# Patient Record
Sex: MALE | Race: WHITE | Employment: OTHER | URBAN - NONMETROPOLITAN AREA
[De-identification: names, ages, dates, MRNs, and addresses within clinical notes are randomized per-mention and may not be internally consistent; named-entity substitution may affect disease eponyms.]

---

## 2017-05-30 ENCOUNTER — HOSPITAL ENCOUNTER (OUTPATIENT)
Age: 62
Setting detail: SPECIMEN
Discharge: HOME | End: 2017-05-30

## 2017-05-30 PROCEDURE — 87070 CULTURE OTHR SPECIMN AEROBIC: CPT

## 2017-05-30 PROCEDURE — 87205 SMEAR GRAM STAIN: CPT

## 2017-06-01 LAB
CULTURE: NORMAL
CULTURE: NORMAL
DIRECT EXAM: NORMAL
DIRECT EXAM: NORMAL
Lab: NORMAL
SPECIMEN DESCRIPTION: NORMAL
SPECIMEN DESCRIPTION: NORMAL
STATUS: NORMAL

## 2018-05-11 ENCOUNTER — OFFICE VISIT (OUTPATIENT)
Dept: PRIMARY CARE CLINIC | Age: 63
End: 2018-05-11

## 2018-05-11 VITALS
DIASTOLIC BLOOD PRESSURE: 93 MMHG | HEART RATE: 90 BPM | SYSTOLIC BLOOD PRESSURE: 146 MMHG | WEIGHT: 218.9 LBS | TEMPERATURE: 98 F | BODY MASS INDEX: 32.42 KG/M2 | HEIGHT: 69 IN | RESPIRATION RATE: 20 BRPM | OXYGEN SATURATION: 95 %

## 2018-05-11 DIAGNOSIS — B35.4 TINEA CORPORIS: ICD-10-CM

## 2018-05-11 DIAGNOSIS — L03.115 CELLULITIS OF RIGHT THIGH: Primary | ICD-10-CM

## 2018-05-11 PROCEDURE — 99213 OFFICE O/P EST LOW 20 MIN: CPT | Performed by: NURSE PRACTITIONER

## 2018-05-11 RX ORDER — CEPHALEXIN 250 MG/1
250 CAPSULE ORAL 4 TIMES DAILY
Qty: 28 CAPSULE | Refills: 0 | Status: SHIPPED | OUTPATIENT
Start: 2018-05-11 | End: 2018-05-18

## 2018-05-11 RX ORDER — CLOTRIMAZOLE 1 %
CREAM (GRAM) TOPICAL
Qty: 30 G | Refills: 0 | Status: SHIPPED | OUTPATIENT
Start: 2018-05-11 | End: 2018-05-18

## 2018-05-11 ASSESSMENT — ENCOUNTER SYMPTOMS
SORE THROAT: 0
SHORTNESS OF BREATH: 0
DIARRHEA: 0
COUGH: 0
VOMITING: 0
WHEEZING: 0
NAUSEA: 0

## 2018-05-17 ENCOUNTER — OFFICE VISIT (OUTPATIENT)
Dept: FAMILY MEDICINE CLINIC | Age: 63
End: 2018-05-17

## 2018-05-17 VITALS
SYSTOLIC BLOOD PRESSURE: 121 MMHG | HEART RATE: 76 BPM | WEIGHT: 215 LBS | BODY MASS INDEX: 31.84 KG/M2 | HEIGHT: 69 IN | DIASTOLIC BLOOD PRESSURE: 81 MMHG

## 2018-05-17 DIAGNOSIS — L60.0 INGROWN NAIL OF GREAT TOE OF RIGHT FOOT: ICD-10-CM

## 2018-05-17 DIAGNOSIS — B35.4 TINEA CORPORIS: Primary | ICD-10-CM

## 2018-05-17 PROCEDURE — 99213 OFFICE O/P EST LOW 20 MIN: CPT | Performed by: INTERNAL MEDICINE

## 2018-05-17 RX ORDER — TERBINAFINE HYDROCHLORIDE 250 MG/1
250 TABLET ORAL DAILY
Qty: 14 TABLET | Refills: 0 | Status: SHIPPED | OUTPATIENT
Start: 2018-05-17 | End: 2018-05-31

## 2018-05-17 ASSESSMENT — ENCOUNTER SYMPTOMS
NAUSEA: 0
CONSTIPATION: 0
RESPIRATORY NEGATIVE: 1
BLOOD IN STOOL: 0
ABDOMINAL PAIN: 0
SORE THROAT: 0
DIARRHEA: 0

## 2018-05-17 ASSESSMENT — PATIENT HEALTH QUESTIONNAIRE - PHQ9
SUM OF ALL RESPONSES TO PHQ QUESTIONS 1-9: 0
SUM OF ALL RESPONSES TO PHQ9 QUESTIONS 1 & 2: 0
2. FEELING DOWN, DEPRESSED OR HOPELESS: 0
1. LITTLE INTEREST OR PLEASURE IN DOING THINGS: 0

## 2018-06-01 ENCOUNTER — OFFICE VISIT (OUTPATIENT)
Dept: FAMILY MEDICINE CLINIC | Age: 63
End: 2018-06-01

## 2018-06-01 VITALS
HEART RATE: 78 BPM | SYSTOLIC BLOOD PRESSURE: 138 MMHG | HEIGHT: 69 IN | BODY MASS INDEX: 31.99 KG/M2 | WEIGHT: 216 LBS | DIASTOLIC BLOOD PRESSURE: 78 MMHG

## 2018-06-01 DIAGNOSIS — L03.314 CELLULITIS OF RIGHT GROIN: ICD-10-CM

## 2018-06-01 DIAGNOSIS — B35.4 TINEA CORPORIS: Primary | ICD-10-CM

## 2018-06-01 PROCEDURE — 99213 OFFICE O/P EST LOW 20 MIN: CPT | Performed by: INTERNAL MEDICINE

## 2018-06-01 RX ORDER — SULFAMETHOXAZOLE AND TRIMETHOPRIM 800; 160 MG/1; MG/1
1 TABLET ORAL 2 TIMES DAILY
Qty: 20 TABLET | Refills: 0 | Status: SHIPPED | OUTPATIENT
Start: 2018-06-01 | End: 2018-06-11

## 2018-06-01 ASSESSMENT — ENCOUNTER SYMPTOMS
ABDOMINAL PAIN: 0
DIARRHEA: 0
NAUSEA: 0
CONSTIPATION: 0
BLOOD IN STOOL: 0
SORE THROAT: 0
RESPIRATORY NEGATIVE: 1

## 2018-06-18 ENCOUNTER — TELEPHONE (OUTPATIENT)
Dept: FAMILY MEDICINE CLINIC | Age: 63
End: 2018-06-18

## 2018-06-18 DIAGNOSIS — R21 RASH OF BODY: Primary | ICD-10-CM

## 2019-10-24 LAB
ALBUMIN SERPL-MCNC: NORMAL G/DL
ALP BLD-CCNC: NORMAL U/L
ALT SERPL-CCNC: NORMAL U/L
ANION GAP SERPL CALCULATED.3IONS-SCNC: NORMAL MMOL/L
AST SERPL-CCNC: NORMAL U/L
BILIRUB SERPL-MCNC: NORMAL MG/DL
BUN BLDV-MCNC: NORMAL MG/DL
CALCIUM SERPL-MCNC: NORMAL MG/DL
CHLORIDE BLD-SCNC: NORMAL MMOL/L
CHOLESTEROL, TOTAL: 184 MG/DL
CHOLESTEROL/HDL RATIO: NORMAL
CO2: NORMAL
CREAT SERPL-MCNC: 1.2 MG/DL
GFR CALCULATED: NORMAL
GLUCOSE BLD-MCNC: NORMAL MG/DL
HDLC SERPL-MCNC: 45 MG/DL (ref 35–70)
LDL CHOLESTEROL CALCULATED: 116 MG/DL (ref 0–160)
POTASSIUM SERPL-SCNC: 4.7 MMOL/L
PROSTATE SPECIFIC ANTIGEN: 1.6 NG/ML
SODIUM BLD-SCNC: NORMAL MMOL/L
TOTAL PROTEIN: NORMAL
TRIGL SERPL-MCNC: 87 MG/DL
TSH SERPL DL<=0.05 MIU/L-ACNC: 2.56 UIU/ML
VLDLC SERPL CALC-MCNC: NORMAL MG/DL

## 2022-04-13 LAB
AVERAGE GLUCOSE: NORMAL
CHOLESTEROL, TOTAL: 167 MG/DL
CHOLESTEROL/HDL RATIO: ABNORMAL
HBA1C MFR BLD: 6.7 %
HDLC SERPL-MCNC: 33 MG/DL (ref 35–70)
LDL CHOLESTEROL CALCULATED: 132 MG/DL (ref 0–160)
NONHDLC SERPL-MCNC: ABNORMAL MG/DL
PROSTATE SPECIFIC ANTIGEN: 1.14 NG/ML
TRIGL SERPL-MCNC: 145 MG/DL
TSH SERPL DL<=0.05 MIU/L-ACNC: 2.22 UIU/ML
VITAMIN D 25-HYDROXY: 77
VITAMIN D2, 25 HYDROXY: NORMAL
VITAMIN D3,25 HYDROXY: NORMAL
VLDLC SERPL CALC-MCNC: ABNORMAL MG/DL

## 2022-04-25 DIAGNOSIS — R53.83 FATIGUE, UNSPECIFIED TYPE: ICD-10-CM

## 2022-04-25 DIAGNOSIS — E78.5 HYPERLIPIDEMIA, UNSPECIFIED HYPERLIPIDEMIA TYPE: ICD-10-CM

## 2022-04-25 DIAGNOSIS — E55.9 VITAMIN D DEFICIENCY, UNSPECIFIED: ICD-10-CM

## 2022-04-25 DIAGNOSIS — R06.00 DYSPNEA, UNSPECIFIED TYPE: Primary | ICD-10-CM

## 2022-04-25 DIAGNOSIS — R60.1 GENERALIZED EDEMA: ICD-10-CM

## 2022-05-04 ENCOUNTER — OFFICE VISIT (OUTPATIENT)
Dept: CARDIOLOGY CLINIC | Age: 67
End: 2022-05-04
Payer: OTHER GOVERNMENT

## 2022-05-04 ENCOUNTER — HOSPITAL ENCOUNTER (OUTPATIENT)
Age: 67
Discharge: HOME | End: 2022-05-04
Payer: OTHER GOVERNMENT

## 2022-05-04 VITALS
OXYGEN SATURATION: 95 % | SYSTOLIC BLOOD PRESSURE: 120 MMHG | BODY MASS INDEX: 35 KG/M2 | HEART RATE: 88 BPM | WEIGHT: 237 LBS | DIASTOLIC BLOOD PRESSURE: 70 MMHG

## 2022-05-04 DIAGNOSIS — I48.92 ATRIAL FLUTTER, UNSPECIFIED TYPE (HCC): Primary | ICD-10-CM

## 2022-05-04 DIAGNOSIS — R94.31 ABNORMAL EKG: ICD-10-CM

## 2022-05-04 DIAGNOSIS — R53.83 FATIGUE, UNSPECIFIED TYPE: ICD-10-CM

## 2022-05-04 DIAGNOSIS — R06.00 DYSPNEA, UNSPECIFIED TYPE: ICD-10-CM

## 2022-05-04 DIAGNOSIS — R06.02 SOB (SHORTNESS OF BREATH): ICD-10-CM

## 2022-05-04 DIAGNOSIS — E55.9 VITAMIN D DEFICIENCY, UNSPECIFIED: ICD-10-CM

## 2022-05-04 DIAGNOSIS — E78.5 HYPERLIPIDEMIA, UNSPECIFIED HYPERLIPIDEMIA TYPE: ICD-10-CM

## 2022-05-04 DIAGNOSIS — R60.1 GENERALIZED EDEMA: ICD-10-CM

## 2022-05-04 LAB
EKG ATRIAL RATE: 87 BPM
EKG P-R INTERVAL: 196 MS
EKG Q-T INTERVAL: 370 MS
EKG QRS DURATION: 100 MS
EKG QTC CALCULATION (BAZETT): 445 MS
EKG R AXIS: 102 DEGREES
EKG T AXIS: 24 DEGREES
EKG VENTRICULAR RATE: 87 BPM

## 2022-05-04 PROCEDURE — 99204 OFFICE O/P NEW MOD 45 MIN: CPT | Performed by: INTERNAL MEDICINE

## 2022-05-04 PROCEDURE — 93010 ELECTROCARDIOGRAM REPORT: CPT | Performed by: INTERNAL MEDICINE

## 2022-05-04 PROCEDURE — 93005 ELECTROCARDIOGRAM TRACING: CPT

## 2022-05-04 RX ORDER — CHLORAL HYDRATE 500 MG
CAPSULE ORAL
COMMUNITY
End: 2022-08-18

## 2022-05-04 RX ORDER — SPIRONOLACTONE 25 MG/1
25 TABLET ORAL DAILY
Qty: 30 TABLET | Refills: 11 | Status: SHIPPED | OUTPATIENT
Start: 2022-05-04

## 2022-05-04 RX ORDER — AMLODIPINE BESYLATE 10 MG/1
10 TABLET ORAL DAILY
COMMUNITY
End: 2022-05-04 | Stop reason: ALTCHOICE

## 2022-05-04 RX ORDER — FUROSEMIDE 20 MG/1
20 TABLET ORAL DAILY
COMMUNITY

## 2022-05-04 RX ORDER — LISINOPRIL 5 MG/1
5 TABLET ORAL DAILY
Qty: 30 TABLET | Refills: 11 | Status: SHIPPED | OUTPATIENT
Start: 2022-05-04

## 2022-05-04 NOTE — PROGRESS NOTES
Ov DR Shamir Rowland consult from [facility]. since December   Gradually gotten worse. First noticed when hunting. Started walking in March   Started eating better. No change. No chest pain   richar ankle edema   Started lasix per [address] 4/6  Been helping. No change in sob.  htn on Norvasc for 6 mths. Uses cpap. Smoker trying to cut back. Drinks 18 beers weekly. Retired  Dry wall contractor. Self employed. History of A Flutter on eliquis  For last couple days. Family history: mother pacemaker  Has one sister no heart disease.  with one child   Daughter Jerry Sousa age 45 living with pt   Since October. Who is taking drugs. Will be seeing DR Back in June  For colonoscopy. Bedside echo done. Will do cardioversion in 3 weeks. Will do lexiscan. Will start aldactone 25 mg daily  Start lisinopril 5 mg daily  Start metoprolol 25 mg 1/2 tab daily   See 1 mth after cardioversion.

## 2022-05-08 NOTE — PROGRESS NOTES
Yuriy Cheng MD  [facility]  [address] [place] [address]  [phone]    May 4, 2022      Chanel Costello NP  [facility] [place]      RE:   Caity Lockett  :  1955    Dear Gypsy Gregory:    CHIEF COMPLAINT:  1. Shortness of breath. 2.  Atrial flutter. 3.  Pedal edema. HISTORY OF PRESENT ILLNESS:  I had the pleasure of seeing Dylan Pinto in our office on 2022. He is a very pleasant 80-year-old gentleman who has never had a cardiac issue in the past.  He has never had a stress test or echocardiogram in the past in his history. In December, he noticed that he was having dyspnea with exertion. For instance, he went hunting and when he was walking, he had to stop because of shortness of breath. This gradually worsened ever since December. He then changed his lifestyle and started walking in March. He also started to eat better; however, he continued to be short of breath with exertion. He had some chest discomfort that had both typical and atypical characteristics. He then developed bilateral ankle edema, which gradually worsened. He was started on Lasix at the [facility] on . It has been helping his edema, but there has been no change in his shortness of breath. When he walked into our office he had to stop on the way because of shortness of breath. He has a history of multiple small left lower lung nodules, which have not changed on CT scans. Because of his shortness of breath, he had a chest x-ray at the [place] that showed increased bronchovascular markings consistent with either inflammation or mild pulmonary vascular congestion. Multiple labs done at the [place] were fairly unremarkable. He had an echocardiogram at the [facility] in [place]. His echo showed an EF of 45% to 50% with mild global hypokinesis. It was noted that he was in atrial flutter. He was started on Eliquis 5 mg b.i.d. two days ago.   He was also placed on Lasix 20 mg daily. His edema is better after being placed on Lasix. He still has PND and orthopnea and feels like he is \"smothering\" when he lies flat. He denies any syncope or near syncope, lightheadedness or dizziness. He cannot tell that he is in atrial flutter. Again, he has no history of any arrhythmias or heart issues prior to his shortness of breath that began in 12/2021. Of note, he had an echocardiogram in 2015 that showed normal LV function with an EF of 60% to 65%. His shortness of breath changed and it became worse approximately three to four weeks ago. His energy level also became very low. It has gradually decreased ever since December but took a turn for the worse three to four weeks ago. CARDIAC RISK FACTORS:  Smoking:  Positive. Hypertension:  Positive. Hyperlipidemia:  Positive. Peripheral Vascular Disease:  Negative. Other Family Members:  Positive. Diabetes:  Positive, with a hemoglobin A1c of 6.7. MEDICATIONS AT THIS TIME:  He is on Eliquis 5 mg b.i.d., Lasix 20 mg daily, vitamin D 2000 units daily, he was on amlodipine 5 mg daily. PAST MEDICAL AND SURGICAL HISTORY:  1. He has a history of left Achilles tendon tear on 02/08/2018. 2.  Adenomatous polyp of the colon on 08/03/2016.  3.  Alcohol abuse on 02/03/2016.  4.  Shoulder pain on 02/19/2014.  5.  Sleep apnea, on a CPAP mask, which he has been on for several years. 6.  Small nodules in the lung, which appear to be benign. 7.  History of a possible mass in the left lobe of the liver by CT scan but was negative by MRI and most consistent with a cyst.    FAMILY HISTORY:  Mother had a myocardial infarction and a pacemaker. He has one sister with no heart disease. SOCIAL HISTORY:  He is 79years old. He is , with one child whose name is Esetfani Lucero, age 45, who was a drug abuser and living with him since October, which causes a fair amount of stress. He is retired.   Smokes approximately a pack of cigarettes per day. He drinks 18 beers weekly. He is a retired  and was self-employed. Does not exercise. Enjoys hunting. REVIEW OF SYSTEMS:  Cardiac as above. Other systems reviewed including constitutional, eyes, ears, nose and throat, cardiovascular, respiratory, GI, , musculoskeletal, integumentary, neurologic, endocrine, hematologic and allergic/immunologic are negative except for what is described above. No weight loss or weight gain. No change in bowel habits. No blood in stool. No fevers, sweats or chills. PHYSICAL EXAMINATION:  VITAL SIGNS:  His blood pressure was 120/70 with a heart rate of 88 with slight irregularity. His O2 sat was 95%. Weight 237 pounds. GENERAL:  He is a pleasant 49-year-old gentleman. Denied pain. He was oriented to person, place and time. Answered questions appropriately. SKIN:  No unusual skin changes. HEENT:  The pupils are equally round and intact. Mucous membranes were dry. NECK:  No JVD. Good carotid pulses. No carotid bruits. No lymphadenopathy or thyromegaly. CARDIOVASCULAR EXAM:  S1 and S2 were normal.  No S3 or S4. Soft systolic blowing type murmur. No diastolic murmur. PMI was normal.  No lift, thrust, or pericardial friction rub. LUNGS:  Few coarse crackles bilaterally with wheezes. ABDOMEN:  Soft and nontender. Good bowel sounds. I could not feel liver, spleen or aorta. EXTREMITIES:  He had 2 to 3+ edema. Skin was warm and dry. No calf tenderness. Nail beds pink. Good cap refill. PULSES:  Bilateral symmetrical radial, brachial and carotid pulses. No carotid bruits. Good femoral and pedal pulses. NEUROLOGIC EXAM:  Within normal limits. PSYCHIATRIC EXAM:  Within normal limits. LABORATORY DATA:  From 04/29/2022, his white count was 6.9, hemoglobin 19.2, platelet count of 168,961. Glucose 127, sodium 140, potassium 4.6, BUN 12, creatinine 1.2, calcium 9.3. AST was 24, ALT was 21.   His cholesterol was 167, , HDL 33, triglycerides 145. TSH was 2.22. PSA 1.14. Urinalysis was unremarkable. Hemoglobin A1c was elevated at 6.7. EKG showed atrial flutter with T-wave abnormalities consistent with inferior ischemia and nonspecific ST changes laterally. Echocardiogram at the [facility] showed an EF of 45% to 50% with mild global hypokinesis of the left ventricle, with an echocardiogram in 2018 showing normal LV function, EF of 60% to 65%. IMPRESSION:  1.  Marked shortness of breath with exertion starting in 12/2021, which continues to worsen. 2.  Chest pain, although it is somewhat atypical.  3.  Mild LV dysfunction, EF of 45% to 50% with global hypokinesis, with an echocardiogram in 2018 showing normal LV size and function, EF of 60% to 65%. 4.  Atrial flutter with a controlled ventricular response, possibly starting three to four weeks ago. 5.  Anticoagulated at this point with Eliquis, which was started approximately two days ago. 6.  Smoking abuse. 7.  History of alcohol abuse, although he states he drinks 18 beers weekly now. 8.  Diabetes with a hemoglobin A1c of 6.7.  9. History of hypertension. 10.  Hyperlipidemia. PLAN:  1. We will do a cardioversion in three weeks to try to convert to sinus rhythm. 2.  We will do a Lexiscan Cardiolite stress test in view of his EKG changes and LV dysfunction. 3.  Start Aldactone 25 mg daily for his pedal edema. 4.  Stop Norvasc. 5.  Start lisinopril 5 mg daily because of his LV dysfunction. 6.  Start metoprolol 25 mg a half a tablet daily because of his LV dysfunction and suspicion of coronary artery disease. 7.  We will call with the results of the stress test.  8.  We will most likely proceed on with a cardiac catheterization. DISCUSSION:  Mr. Ashley Moe has had a marked change in his activity level, starting in 12/2021 when he began developing increasing shortness of breath.   This has worsened ever since, but took a turn approximately three to four weeks ago where it worsened even more. It is possible that he developed atrial flutter around that time. I doubt, however, that atrial flutter has caused his decline in activity since December. We think the flutter would be a more recent change, possibly three to four weeks ago when his activity level changed again and became worse. I highly suspect coronary artery disease with shortness of breath as an anginal equivalent along with his atypical chest pain. He does have global hypokinesis with an EF in the 45% to 50% range. His EF was normal in 2018. We will treat him both for coronary artery disease and for LV dysfunction. We will start metoprolol 25 mg a half a tablet daily and lisinopril 5 mg daily as well as start Aldactone 25 mg daily. I will stop Norvasc because of its side effect of causing pedal edema, which we are trying to improve with Aldactone. I do suspect there is a pulmonary contribution to his shortness of breath with his long-term smoking history. At some point, he will need a pulmonary function test.    After he has been anticoagulated for three weeks, we will do a cardioversion. In the meantime, we will do a Lexiscan stress test to see if he has significant ischemia present. We will almost certainly at some point do a cardiac catheterization to define his anatomy in view of his risk factors. We will be in touch with him after his stress test, and if it is abnormal we will bring him up for a discussion. We will plan on a cardioversion in three weeks ago and I will follow up after the cardioversion to review his symptoms. Thank you very much, Holley Spence, for allowing me the privilege of seeing Mr. Shahbaz Santiago. I will keep you informed as to his cardiac issues.     Sincerely,      Ferny Maldonado    D: 05/05/2022 4:39:43     T: 05/05/2022 4:47:43     [email or web address]  Job#: [id number]   Doc#: [id number]    CC:  Truman Fox

## 2022-05-09 ENCOUNTER — HOSPITAL ENCOUNTER (OUTPATIENT)
Dept: NON INVASIVE DIAGNOSTICS | Age: 67
Discharge: HOME | End: 2022-05-09
Payer: OTHER GOVERNMENT

## 2022-05-09 ENCOUNTER — HOSPITAL ENCOUNTER (OUTPATIENT)
Dept: NUCLEAR MEDICINE | Age: 67
Discharge: HOME | End: 2022-05-11
Payer: OTHER GOVERNMENT

## 2022-05-09 VITALS — SYSTOLIC BLOOD PRESSURE: 118 MMHG | HEART RATE: 73 BPM | DIASTOLIC BLOOD PRESSURE: 79 MMHG

## 2022-05-09 DIAGNOSIS — R94.31 ABNORMAL EKG: ICD-10-CM

## 2022-05-09 DIAGNOSIS — R06.02 SOB (SHORTNESS OF BREATH): ICD-10-CM

## 2022-05-09 PROCEDURE — A9500 TC99M SESTAMIBI: HCPCS | Performed by: INTERNAL MEDICINE

## 2022-05-09 PROCEDURE — 93017 CV STRESS TEST TRACING ONLY: CPT

## 2022-05-09 PROCEDURE — 2580000003 HC RX 258: Performed by: INTERNAL MEDICINE

## 2022-05-09 PROCEDURE — 78452 HT MUSCLE IMAGE SPECT MULT: CPT

## 2022-05-09 PROCEDURE — 3430000000 HC RX DIAGNOSTIC RADIOPHARMACEUTICAL: Performed by: INTERNAL MEDICINE

## 2022-05-09 PROCEDURE — 6360000002 HC RX W HCPCS: Performed by: INTERNAL MEDICINE

## 2022-05-09 RX ORDER — AMINOPHYLLINE DIHYDRATE 25 MG/ML
50 INJECTION, SOLUTION INTRAVENOUS PRN
Status: DISPENSED | OUTPATIENT
Start: 2022-05-09 | End: 2022-05-09

## 2022-05-09 RX ORDER — SODIUM CHLORIDE 0.9 % (FLUSH) 0.9 %
5-40 SYRINGE (ML) INJECTION PRN
Status: ACTIVE | OUTPATIENT
Start: 2022-05-09 | End: 2022-05-09

## 2022-05-09 RX ADMIN — TETRAKIS(2-METHOXYISOBUTYLISOCYANIDE)COPPER(I) TETRAFLUOROBORATE 10 MILLICURIE: 1 INJECTION, POWDER, LYOPHILIZED, FOR SOLUTION INTRAVENOUS at 07:55

## 2022-05-09 RX ADMIN — TETRAKIS(2-METHOXYISOBUTYLISOCYANIDE)COPPER(I) TETRAFLUOROBORATE 30 MILLICURIE: 1 INJECTION, POWDER, LYOPHILIZED, FOR SOLUTION INTRAVENOUS at 09:15

## 2022-05-09 RX ADMIN — Medication 10 ML: at 09:13

## 2022-05-09 RX ADMIN — REGADENOSON 0.4 MG: 0.08 INJECTION, SOLUTION INTRAVENOUS at 09:13

## 2022-05-09 NOTE — PROGRESS NOTES
Testing complete - patient tolerated well. Patient states his shortness of breath is back to baseline - denies any pain/discomofort. Drink and snack offered.

## 2022-05-09 NOTE — PROCEDURES
[place] [address]                              CARDIAC STRESS TEST    PATIENT NAME: Niyah Suggs                    :        1955  MED REC NO:   [id number]                              ROOM:  ACCOUNT NO:   [de-identified]                           ADMIT DATE: 2022  PROVIDER:     Pablito Tyson    DATE OF STUDY:  2022    Cardiovascular Diagnostics Department    Ordering Provider:  Tricia Adames MD    Primary Care Provider:  Germán Aguilar MD    Interpreting Physician:  Pablito Tyson MD    MYOCARDIAL PERFUSION STRESS IMAGING    The stress ECG results are reported separately. NUCLEAR IMAGING RESULTS:  The overall quality of the study is good. Mild/moderate attenuation artifact was seen. There is no evidence of  abnormal lung uptake. Additionally, the right ventricle appears normal.  The left ventricular cavity is noted to be normal in size on stress  images. There is evidence of transient ischemic dilatation (TID) of the  left ventricle. Gated SPECT imaging reveals normal myocardial thickening and wall motion  with a calculated left ventricular ejection fraction (EF) of 56%. The rest images demonstrated a small perfusion abnormality of moderate  intensity in the inferior and inferoseptal region(s), which is most  likely due to artifact. On stress imaging, a small/moderate perfusion abnormality of mild  intensity was noted in the inferolateral and inferior region(s), which  is most likely due to artifact. IMPRESSION:  1. Equivocal myocardial perfusion study. There is a small/moderate  perfusion defect of mild intensity in the inferolateral and inferior  region(s) during stress imaging, which is most consistent with artifact,  but may be due to a small degree of coronary ischemia.     In addition, transient ischemic dilatation (TID) of the left ventricle  is seen, which can be seen with uncontrolled hypertension, severe left  main coronary artery disease or severe three-vessel coronary artery  disease (TID 1.35). 2.  Global left ventricular systolic function was normal without  regional wall motion abnormalities. Overall these results are most consistent with an intermediate/high risk  for significant coronary artery disease. Additional testing including cardiac catheterization may be indicated. The results of this test were discussed with Dr. Gomez Yadav on 05/09/2022.         Leora Rayo    D: 05/09/2022 15:25:39       T: 05/09/2022 15:27:10     JESSICA/[email or web address]_EDIT  Job#: [id number]     Doc#: Unknown    CC:  Truman Reyes

## 2022-05-10 NOTE — PROCEDURES
[place] [address]                              CARDIAC STRESS TEST    PATIENT NAME: Prem ARNOLD                    :        1955  MED REC NO:   [id number]                              ROOM:  ACCOUNT NO:   [de-identified]                           ADMIT DATE: 2022  PROVIDER:     Lesa Hargrove      DATE OF STUDY:  2022    LEXISCAN CARDIOLITE STRESS TEST:    INDICATION:  Chest pain. IMPRESSION:  1. We gave 0.4 mg of Lexiscan intravenously. 2.  This was followed in 20 seconds by Cardiolite infusion. 3.  There was no chest pain. 4.  There was no ST depression. 5.  It was an overall negative Lexiscan stress test.  6.  Cardiolite to follow.         Hyla Frankel    D: 05/10/2022 7:22:31       T: 05/10/2022 7:55:44     [email or web address]  Job#: [id number]     Doc#: [id number]    CC:  Truman Fox

## 2022-05-12 ENCOUNTER — TELEPHONE (OUTPATIENT)
Dept: CARDIOLOGY CLINIC | Age: 67
End: 2022-05-12

## 2022-05-25 ENCOUNTER — HOSPITAL ENCOUNTER (OUTPATIENT)
Dept: NON INVASIVE DIAGNOSTICS | Age: 67
Discharge: HOME | End: 2022-05-25
Payer: OTHER GOVERNMENT

## 2022-05-25 VITALS
OXYGEN SATURATION: 96 % | DIASTOLIC BLOOD PRESSURE: 84 MMHG | RESPIRATION RATE: 12 BRPM | HEART RATE: 83 BPM | SYSTOLIC BLOOD PRESSURE: 128 MMHG

## 2022-05-25 DIAGNOSIS — I48.92 ATRIAL FLUTTER, UNSPECIFIED TYPE (HCC): ICD-10-CM

## 2022-05-25 PROCEDURE — 93005 ELECTROCARDIOGRAM TRACING: CPT | Performed by: INTERNAL MEDICINE

## 2022-05-25 PROCEDURE — 6360000002 HC RX W HCPCS: Performed by: INTERNAL MEDICINE

## 2022-05-25 PROCEDURE — 2580000003 HC RX 258: Performed by: INTERNAL MEDICINE

## 2022-05-25 PROCEDURE — 92960 CARDIOVERSION ELECTRIC EXT: CPT

## 2022-05-25 RX ORDER — SODIUM CHLORIDE 450 MG/100ML
INJECTION, SOLUTION INTRAVENOUS CONTINUOUS PRN
Status: COMPLETED | OUTPATIENT
Start: 2022-05-25 | End: 2022-05-25

## 2022-05-25 RX ORDER — PROPOFOL 10 MG/ML
INJECTION, EMULSION INTRAVENOUS CONTINUOUS PRN
Status: COMPLETED | OUTPATIENT
Start: 2022-05-25 | End: 2022-05-25

## 2022-05-25 RX ADMIN — PROPOFOL 100 MG: 10 INJECTION INTRAVENOUS at 12:12

## 2022-05-25 RX ADMIN — SODIUM CHLORIDE 100 ML/HR: 4.5 INJECTION, SOLUTION INTRAVENOUS at 11:56

## 2022-05-25 NOTE — PRE SEDATION
Sedation Pre-Procedure Note    Patient Name: Martha Allred   YOB: 1955  Room/Bed: Room/bed info not found  Medical Record Number: [id number]  Date: 5/25/2022   Time: 11:20 AM       Indication: atrial fibrillation    Consent: I have discussed with the patient and/or the patient representative the indication, alternatives, and the possible risks and/or complications of the planned procedure and the anesthesia methods. The patient and/or patient representative appear to understand and agree to proceed. Vital Signs: There were no vitals filed for this visit. Past Medical History:   has a past medical history of Hyperlipidemia and Primary hypertension. Past Surgical History:   has a past surgical history that includes Colonoscopy (09/13/2012); Appendectomy; Tonsillectomy; vascular surgery; hernia repair; Colonoscopy (07/19/2016); and Achilles tendon surgery. Medications:   Scheduled Meds:   Continuous Infusions:   PRN Meds:   Home Meds:   Prior to Admission medications    Medication Sig Start Date End Date Taking?  Authorizing Provider   Omega-3 1000 MG CAPS Take by mouth 4 tabs bid    Historical Provider, MD   apixaban (ELIQUIS) 5 MG TABS tablet Take by mouth 2 times daily    Historical Provider, MD   furosemide (LASIX) 20 MG tablet Take 20 mg by mouth daily    Historical Provider, MD   metoprolol tartrate (LOPRESSOR) 25 MG tablet Take 0.5 tablets by mouth daily 5/4/22   Roxane Harden MD   lisinopril (PRINIVIL;ZESTRIL) 5 MG tablet Take 1 tablet by mouth daily 5/4/22   Roxane Harden MD   spironolactone (ALDACTONE) 25 MG tablet Take 1 tablet by mouth daily 5/4/22   Roxane Harden MD   Cholecalciferol (VITAMIN D PO) Take 2,000 Units by mouth     Historical Provider, MD     Coumadin Use Last 7 Days:  no  Antiplatelet drug therapy use last 7 days: no  Other anticoagulant use last 7 days: yes - eliquis  Additional Medication Information:          Pre-Sedation Documentation and Exam:   I have personally completed a history, physical exam & review of systems for this patient (see notes).     Mallampati Airway Assessment:  normal    Prior History of Anesthesia Complications:   none    ASA Classification:  Class 2 - A normal healthy patient with mild systemic disease    Sedation/ Anesthesia Plan:   intravenous sedation    Medications Planned:   propofol intravenously    Patient is an appropriate candidate for plan of sedation: yes    Electronically signed by Kitty Wagner MD on 5/25/2022 at 11:20 AM

## 2022-05-25 NOTE — H&P
Sowmya Schultz MD  [facility]  [facility]  [address] Khush[address] Hebert [address]  [phone]     May 4, 2022        Dexter Sharma NP  [facility] [place]        RE:   Kaylan Barriga  :  1955     Dear [employer]:     CHIEF COMPLAINT:  1. Shortness of breath. 2.  Atrial flutter. 3.  Pedal edema.     HISTORY OF PRESENT ILLNESS:  I had the pleasure of seeing Indio Ac in our office on 2022. He is a very pleasant 79-year-old gentleman who has never had a cardiac issue in the past.  He has never had a stress test or echocardiogram in the past in his history.     In December, he noticed that he was having dyspnea with exertion. For instance, he went hunting and when he was walking, he had to stop because of shortness of breath. This gradually worsened ever since December. He then changed his lifestyle and started walking in March. He also started to eat better; however, he continued to be short of breath with exertion. He had some chest discomfort that had both typical and atypical characteristics.     He then developed bilateral ankle edema, which gradually worsened.     He was started on Lasix at the [facility] on . It has been helping his edema, but there has been no change in his shortness of breath. When he walked into our office he had to stop on the way because of shortness of breath.     He has a history of multiple small left lower lung nodules, which have not changed on CT scans.     Because of his shortness of breath, he had a chest x-ray at the [place] that showed increased bronchovascular markings consistent with either inflammation or mild pulmonary vascular congestion. Multiple labs done at the [place] were fairly unremarkable.     He had an echocardiogram at the [facility] in [place]. His echo showed an EF of 45% to 50% with mild global hypokinesis. It was noted that he was in atrial flutter. He was started on Eliquis 5 mg b.i.d. two days ago. He was also placed on Lasix 20 mg daily.     His edema is better after being placed on Lasix. He still has PND and orthopnea and feels like he is \"smothering\" when he lies flat.     He denies any syncope or near syncope, lightheadedness or dizziness. He cannot tell that he is in atrial flutter. Again, he has no history of any arrhythmias or heart issues prior to his shortness of breath that began in 12/2021.     Of note, he had an echocardiogram in 2015 that showed normal LV function with an EF of 60% to 65%.     His shortness of breath changed and it became worse approximately three to four weeks ago. His energy level also became very low. It has gradually decreased ever since December but took a turn for the worse three to four weeks ago.     CARDIAC RISK FACTORS:  Smoking:  Positive. Hypertension:  Positive. Hyperlipidemia:  Positive. Peripheral Vascular Disease:  Negative. Other Family Members:  Positive. Diabetes:  Positive, with a hemoglobin A1c of 6.7.     MEDICATIONS AT THIS TIME:  He is on Eliquis 5 mg b.i.d., Lasix 20 mg daily, vitamin D 2000 units daily, he was on amlodipine 5 mg daily.     PAST MEDICAL AND SURGICAL HISTORY:  1. He has a history of left Achilles tendon tear on 02/08/2018. 2.  Adenomatous polyp of the colon on 08/03/2016.  3.  Alcohol abuse on 02/03/2016.  4.  Shoulder pain on 02/19/2014.  5.  Sleep apnea, on a CPAP mask, which he has been on for several years. 6.  Small nodules in the lung, which appear to be benign. 7.  History of a possible mass in the left lobe of the liver by CT scan but was negative by MRI and most consistent with a cyst.     FAMILY HISTORY:  Mother had a myocardial infarction and a pacemaker. He has one sister with no heart disease.     SOCIAL HISTORY:  He is 79years old. He is , with one child whose name is Simona Deutsch, age 45, who was a drug abuser and living with him since October, which causes a fair amount of stress. He is retired.   Smokes approximately a pack of cigarettes per day. He drinks 18 beers weekly. He is a retired  and was self-employed. Does not exercise. Enjoys hunting.     REVIEW OF SYSTEMS:  Cardiac as above. Other systems reviewed including constitutional, eyes, ears, nose and throat, cardiovascular, respiratory, GI, , musculoskeletal, integumentary, neurologic, endocrine, hematologic and allergic/immunologic are negative except for what is described above. No weight loss or weight gain. No change in bowel habits. No blood in stool. No fevers, sweats or chills.     PHYSICAL EXAMINATION:  VITAL SIGNS:  His blood pressure was 120/70 with a heart rate of 88 with slight irregularity. His O2 sat was 95%. Weight 237 pounds. GENERAL:  He is a pleasant 72-year-old gentleman. Denied pain. He was oriented to person, place and time. Answered questions appropriately. SKIN:  No unusual skin changes. HEENT:  The pupils are equally round and intact. Mucous membranes were dry. NECK:  No JVD. Good carotid pulses. No carotid bruits. No lymphadenopathy or thyromegaly. CARDIOVASCULAR EXAM:  S1 and S2 were normal.  No S3 or S4. Soft systolic blowing type murmur. No diastolic murmur. PMI was normal.  No lift, thrust, or pericardial friction rub. LUNGS:  Few coarse crackles bilaterally with wheezes. ABDOMEN:  Soft and nontender. Good bowel sounds. I could not feel liver, spleen or aorta. EXTREMITIES:  He had 2 to 3+ edema. Skin was warm and dry. No calf tenderness. Nail beds pink. Good cap refill. PULSES:  Bilateral symmetrical radial, brachial and carotid pulses. No carotid bruits. Good femoral and pedal pulses. NEUROLOGIC EXAM:  Within normal limits. PSYCHIATRIC EXAM:  Within normal limits.     LABORATORY DATA:  From 04/29/2022, his white count was 6.9, hemoglobin 19.2, platelet count of 776,246. Glucose 127, sodium 140, potassium 4.6, BUN 12, creatinine 1.2, calcium 9.3. AST was 24, ALT was 21. His cholesterol was 167, , HDL 33, triglycerides 145. TSH was 2.22. PSA 1.14. Urinalysis was unremarkable. Hemoglobin A1c was elevated at 6.7.     EKG showed atrial flutter with T-wave abnormalities consistent with inferior ischemia and nonspecific ST changes laterally.     Echocardiogram at the [facility] showed an EF of 45% to 50% with mild global hypokinesis of the left ventricle, with an echocardiogram in 2018 showing normal LV function, EF of 60% to 65%.     IMPRESSION:  1.  Marked shortness of breath with exertion starting in 12/2021, which continues to worsen. 2.  Chest pain, although it is somewhat atypical.  3.  Mild LV dysfunction, EF of 45% to 50% with global hypokinesis, with an echocardiogram in 2018 showing normal LV size and function, EF of 60% to 65%. 4.  Atrial flutter with a controlled ventricular response, possibly starting three to four weeks ago. 5.  Anticoagulated at this point with Eliquis, which was started approximately two days ago. 6.  Smoking abuse. 7.  History of alcohol abuse, although he states he drinks 18 beers weekly now. 8.  Diabetes with a hemoglobin A1c of 6.7.  9. History of hypertension. 10.  Hyperlipidemia.     PLAN:  1. We will do a cardioversion in three weeks to try to convert to sinus rhythm. 2.  We will do a Lexiscan Cardiolite stress test in view of his EKG changes and LV dysfunction. 3.  Start Aldactone 25 mg daily for his pedal edema. 4.  Stop Norvasc. 5.  Start lisinopril 5 mg daily because of his LV dysfunction. 6.  Start metoprolol 25 mg a half a tablet daily because of his LV dysfunction and suspicion of coronary artery disease. 7.  We will call with the results of the stress test.  8.  We will most likely proceed on with a cardiac catheterization.     DISCUSSION:  Mr. Godwin Hargrove has had a marked change in his activity level, starting in 12/2021 when he began developing increasing shortness of breath.   This has worsened ever since, but took a turn approximately three to four weeks ago where it worsened even more. It is possible that he developed atrial flutter around that time. I doubt, however, that atrial flutter has caused his decline in activity since December. We think the flutter would be a more recent change, possibly three to four weeks ago when his activity level changed again and became worse.     I highly suspect coronary artery disease with shortness of breath as an anginal equivalent along with his atypical chest pain. He does have global hypokinesis with an EF in the 45% to 50% range. His EF was normal in 2018.     We will treat him both for coronary artery disease and for LV dysfunction. We will start metoprolol 25 mg a half a tablet daily and lisinopril 5 mg daily as well as start Aldactone 25 mg daily.     I will stop Norvasc because of its side effect of causing pedal edema, which we are trying to improve with Aldactone. I do suspect there is a pulmonary contribution to his shortness of breath with his long-term smoking history. At some point, he will need a pulmonary function test.     After he has been anticoagulated for three weeks, we will do a cardioversion. In the meantime, we will do a Lexiscan stress test to see if he has significant ischemia present.     We will almost certainly at some point do a cardiac catheterization to define his anatomy in view of his risk factors.     We will be in touch with him after his stress test, and if it is abnormal we will bring him up for a discussion. We will plan on a cardioversion in three weeks ago and I will follow up after the cardioversion to review his symptoms.     Thank you very much, Tammy Valle, for allowing me the privilege of seeing Mr. Maikol Zapata.   I will keep you informed as to his cardiac issues.     Sincerely,        Renata Barcenas

## 2022-05-26 LAB
EKG ATRIAL RATE: 264 BPM
EKG ATRIAL RATE: 82 BPM
EKG P AXIS: -93 DEGREES
EKG P AXIS: 75 DEGREES
EKG P-R INTERVAL: 156 MS
EKG Q-T INTERVAL: 336 MS
EKG Q-T INTERVAL: 386 MS
EKG QRS DURATION: 114 MS
EKG QRS DURATION: 86 MS
EKG QTC CALCULATION (BAZETT): 390 MS
EKG QTC CALCULATION (BAZETT): 450 MS
EKG R AXIS: 85 DEGREES
EKG R AXIS: 93 DEGREES
EKG T AXIS: 35 DEGREES
EKG T AXIS: 69 DEGREES
EKG VENTRICULAR RATE: 81 BPM
EKG VENTRICULAR RATE: 82 BPM

## 2022-05-26 PROCEDURE — 93010 ELECTROCARDIOGRAM REPORT: CPT | Performed by: INTERNAL MEDICINE

## 2022-06-09 NOTE — PROGRESS NOTES
[facility]   Preadmission Testing    Name: Laura Stephenson  : 1955  Patient Phone: [phone] (home)     Procedure: Colonoscopy  Date of Procedure: 2022  Surgeon: Ruma Hodge MD    Ht:  5' 9\" (175.3 cm)  Wt: 231 lb (104.8 kg)  Wt method: Stated    Allergies: No Known Allergies             There were no vitals filed for this visit. No LMP for male patient. Do you take blood thinners? [x] Yes    [] No         Instructed to stop blood thinners prior to procedure? [x] Yes    [] No      [] N/A   Do you have sleep apnea? [x] Yes    [] No     Do you have acid reflux ? [] Yes    [x] No     Do you have  hiatal hernia? [] Yes    [x] No    Do you ever experience motion sickness? [] Yes    [x] No     Have you had a respiratory infection or sore throat in last 4 weeks before surgery? [] Yes    [x] No     Do you have poorly controlled asthma or COPD? Difficulty with intubation in past? [] Yes    [x] No      [] Yes    [x] No       Do you have a history of angina in the last month or symptomatic arrhythmia? [] Yes    [x] No     Do you have significant central nervous system disease? [] Yes    [x] No     Have you had an EKG, labs, or chest xray in last 12 months? If yes provide copies to anesthesia   [x] Yes    [] No       [x] Lab    [x] EKG    [] CXR     Have you had a stress test?     [x] Yes    [] No    When[facility]where: [facility]    Was it normal?    [x] Yes    [] No     Do you or your family have a history of Malignant Hyperthermia? [] Yes    [x] No           Do you smoke? [x] Yes    [] No      Please refrain from smoking on the day of surgery. Patient instructed on: [x] NPO Status   [x] Meds to Take  [x] Ride Home  [x]No Jewelry/Contact Lenses/Nail [place]  [x] Prep/Lax/Clear Liquids    [] Chlorhexidene     DOS Patient Needs [] HCG   [] Blood Sugar  [] PT/INR    [] T&S       COVID Vaccinated?  [] Yes    [x] No           PAT Call/Visit Questions  Person Interviewed: Lisette Cristobal Patient instructed on the pre-operative, intra-operative, and post-operative process? Yes  Medication instructions reviewed with patient?   Yes

## 2022-06-16 ENCOUNTER — OFFICE VISIT (OUTPATIENT)
Dept: FAMILY MEDICINE CLINIC | Age: 67
End: 2022-06-16
Payer: OTHER GOVERNMENT

## 2022-06-16 VITALS
DIASTOLIC BLOOD PRESSURE: 66 MMHG | SYSTOLIC BLOOD PRESSURE: 120 MMHG | WEIGHT: 227 LBS | HEART RATE: 64 BPM | HEIGHT: 69 IN | BODY MASS INDEX: 33.62 KG/M2

## 2022-06-16 DIAGNOSIS — Z12.11 COLON CANCER SCREENING: Primary | ICD-10-CM

## 2022-06-16 DIAGNOSIS — D49.2 NEOPLASM OF SKIN OF LOWER LEG: ICD-10-CM

## 2022-06-16 DIAGNOSIS — Z86.010 HISTORY OF ADENOMATOUS POLYP OF COLON: ICD-10-CM

## 2022-06-16 PROCEDURE — 1123F ACP DISCUSS/DSCN MKR DOCD: CPT | Performed by: INTERNAL MEDICINE

## 2022-06-16 PROCEDURE — 99203 OFFICE O/P NEW LOW 30 MIN: CPT | Performed by: INTERNAL MEDICINE

## 2022-06-16 RX ORDER — SODIUM, POTASSIUM,MAG SULFATES 17.5-3.13G
SOLUTION, RECONSTITUTED, ORAL ORAL
Status: CANCELLED | OUTPATIENT
Start: 2022-06-16

## 2022-06-16 SDOH — ECONOMIC STABILITY: FOOD INSECURITY: WITHIN THE PAST 12 MONTHS, YOU WORRIED THAT YOUR FOOD WOULD RUN OUT BEFORE YOU GOT MONEY TO BUY MORE.: NEVER TRUE

## 2022-06-16 SDOH — ECONOMIC STABILITY: FOOD INSECURITY: WITHIN THE PAST 12 MONTHS, THE FOOD YOU BOUGHT JUST DIDN'T LAST AND YOU DIDN'T HAVE MONEY TO GET MORE.: NEVER TRUE

## 2022-06-16 ASSESSMENT — PATIENT HEALTH QUESTIONNAIRE - PHQ9
SUM OF ALL RESPONSES TO PHQ QUESTIONS 1-9: 0
1. LITTLE INTEREST OR PLEASURE IN DOING THINGS: 0
SUM OF ALL RESPONSES TO PHQ QUESTIONS 1-9: 0
SUM OF ALL RESPONSES TO PHQ QUESTIONS 1-9: 0
SUM OF ALL RESPONSES TO PHQ9 QUESTIONS 1 & 2: 0
SUM OF ALL RESPONSES TO PHQ QUESTIONS 1-9: 0
2. FEELING DOWN, DEPRESSED OR HOPELESS: 0

## 2022-06-16 ASSESSMENT — ENCOUNTER SYMPTOMS
DIARRHEA: 0
BLOOD IN STOOL: 0
CONSTIPATION: 0
ABDOMINAL PAIN: 0
SORE THROAT: 0
NAUSEA: 0
RESPIRATORY NEGATIVE: 1

## 2022-06-16 ASSESSMENT — SOCIAL DETERMINANTS OF HEALTH (SDOH): HOW HARD IS IT FOR YOU TO PAY FOR THE VERY BASICS LIKE FOOD, HOUSING, MEDICAL CARE, AND HEATING?: NOT VERY HARD

## 2022-06-16 NOTE — PROGRESS NOTES
Pelon Banegas (:  1955) is a 79 y.o. male,Established patient, here for evaluation of the following chief complaint(s):  Surgical Consult (colonoscopy consult. Scheduled for 22. H/o adenomatous polyps. Last scope 16. )         ASSESSMENT/PLAN:  1. Colon cancer screening  -     COLONOSCOPY W/ OR W/O BIOPSY; Future  2. History of adenomatous polyp of colon  -     COLONOSCOPY W/ OR W/O BIOPSY; Future  3. Neoplasm of skin of lower leg      Plan:  1. Colon cancer screening  Set up for screening colonoscopy. Last colonoscopy with adenomatous polyp with the recommendation to repeat the colonoscopy in 5 years. Risk and benefits explained, informed consent obtained. The bowel prep was explained to [facility] and he was instructed to start the prep the day before the procedure (printed directions were given). Avoid corn 1 week prior to the procedure as this can cause suctioning difficulties during the procedure. Avoid eating or drinking at least 8 hours prior to the procedure. [facility] was encouraged to call the clinic if he has any questions prior to the procedure. Hold Eliquis 3 days before the procedure. 2. History of adenomatous polyp of colon      3. Neoplasm of skin of lower leg  Return for shave biopsy. Concerned about a basal or squamous cell carcinoma. Follow up after the procedure. Subjective   SUBJECTIVE/OBJECTIVE:  [facility] a patient of mine and the [address] presents to be evaluated for a colonoscopy. [facility] is 79 y.o. and has had a colonoscopy in the past.  There is  a history of colon polyps (last in 2016 with one adenomatous polyp). Currently [facility] denies rectal bleeding, denies bowel habit changes, and denies abdominal pain. There is not a family history of colon cancer. Lesion on the left lower leg that he has had x 3 months that won't heal.  He states he does scratch it often to get the scab off but does bleed a lot.   No history of skin cancer in the past.  He has tried healing salve with no improvement. Review of Systems   Constitutional: Negative. HENT: Negative for congestion, ear pain, postnasal drip, sneezing and sore throat. Eyes: Negative for visual disturbance. Respiratory: Negative. Cardiovascular: Negative for chest pain, palpitations and leg swelling. Gastrointestinal: Negative for abdominal pain, blood in stool, constipation, diarrhea and nausea. Genitourinary: Negative for difficulty urinating, dysuria, frequency and urgency. Musculoskeletal: Negative for arthralgias, joint swelling, myalgias, neck pain and neck stiffness. Skin: Negative. Neurological: Negative for syncope. Psychiatric/Behavioral: Negative. Objective   Physical Exam  Vitals and nursing note reviewed. Constitutional:       Appearance: He is well-developed. HENT:      Head: Atraumatic. Eyes:      Conjunctiva/sclera: Conjunctivae normal.   Cardiovascular:      Rate and Rhythm: Normal rate and regular rhythm. Heart sounds: Murmur heard. Pulmonary:      Effort: Pulmonary effort is normal.      Breath sounds: Normal breath sounds. Abdominal:      Palpations: Abdomen is soft. Tenderness: There is no abdominal tenderness. Musculoskeletal:      Cervical back: Normal range of motion and neck supple. Lymphadenopathy:      Cervical: No cervical adenopathy. Skin:     Findings: No rash. Comments: Left lower leg with a 1 cm raised nodule with a scabbed over top. Neurological:      Mental Status: He is alert. Psychiatric:         Behavior: Behavior normal.         Thought Content: Thought content normal.                  An electronic signature was used to authenticate this note.     --Janey Bolivar MD

## 2022-06-17 ENCOUNTER — PREP FOR PROCEDURE (OUTPATIENT)
Dept: FAMILY MEDICINE CLINIC | Age: 67
End: 2022-06-17

## 2022-06-17 RX ORDER — SODIUM CHLORIDE 0.9 % (FLUSH) 0.9 %
5-40 SYRINGE (ML) INJECTION PRN
Status: CANCELLED | OUTPATIENT
Start: 2022-06-17

## 2022-06-17 RX ORDER — SODIUM CHLORIDE 0.9 % (FLUSH) 0.9 %
5-40 SYRINGE (ML) INJECTION EVERY 12 HOURS SCHEDULED
Status: CANCELLED | OUTPATIENT
Start: 2022-06-17

## 2022-06-17 RX ORDER — SODIUM CHLORIDE 9 MG/ML
25 INJECTION, SOLUTION INTRAVENOUS PRN
Status: CANCELLED | OUTPATIENT
Start: 2022-06-17

## 2022-06-17 RX ORDER — SODIUM CHLORIDE, SODIUM LACTATE, POTASSIUM CHLORIDE, CALCIUM CHLORIDE 600; 310; 30; 20 MG/100ML; MG/100ML; MG/100ML; MG/100ML
INJECTION, SOLUTION INTRAVENOUS CONTINUOUS
Status: CANCELLED | OUTPATIENT
Start: 2022-06-17

## 2022-06-21 ENCOUNTER — ANESTHESIA EVENT (OUTPATIENT)
Dept: ENDOSCOPY | Age: 67
End: 2022-06-21
Payer: OTHER GOVERNMENT

## 2022-06-22 ENCOUNTER — HOSPITAL ENCOUNTER (OUTPATIENT)
Age: 67
Discharge: HOME | End: 2022-06-22
Payer: OTHER GOVERNMENT

## 2022-06-22 ENCOUNTER — OFFICE VISIT (OUTPATIENT)
Dept: CARDIOLOGY CLINIC | Age: 67
End: 2022-06-22

## 2022-06-22 VITALS
OXYGEN SATURATION: 95 % | WEIGHT: 227 LBS | HEART RATE: 92 BPM | SYSTOLIC BLOOD PRESSURE: 112 MMHG | DIASTOLIC BLOOD PRESSURE: 70 MMHG | BODY MASS INDEX: 33.52 KG/M2

## 2022-06-22 DIAGNOSIS — I48.92 ATRIAL FLUTTER, UNSPECIFIED TYPE (HCC): ICD-10-CM

## 2022-06-22 DIAGNOSIS — I48.92 ATRIAL FLUTTER, UNSPECIFIED TYPE (HCC): Primary | ICD-10-CM

## 2022-06-22 LAB
EKG ATRIAL RATE: 277 BPM
EKG P AXIS: -95 DEGREES
EKG Q-T INTERVAL: 358 MS
EKG QRS DURATION: 94 MS
EKG QTC CALCULATION (BAZETT): 437 MS
EKG R AXIS: 85 DEGREES
EKG T AXIS: 64 DEGREES
EKG VENTRICULAR RATE: 90 BPM

## 2022-06-22 PROCEDURE — 93010 ELECTROCARDIOGRAM REPORT: CPT | Performed by: INTERNAL MEDICINE

## 2022-06-22 PROCEDURE — 99214 OFFICE O/P EST MOD 30 MIN: CPT | Performed by: INTERNAL MEDICINE

## 2022-06-22 PROCEDURE — 93005 ELECTROCARDIOGRAM TRACING: CPT

## 2022-06-22 PROCEDURE — 1123F ACP DISCUSS/DSCN MKR DOCD: CPT | Performed by: INTERNAL MEDICINE

## 2022-06-22 RX ORDER — DILTIAZEM HYDROCHLORIDE 120 MG/1
120 CAPSULE, COATED, EXTENDED RELEASE ORAL DAILY
Qty: 30 CAPSULE | Refills: 11 | Status: SHIPPED | OUTPATIENT
Start: 2022-06-22

## 2022-06-22 NOTE — PROGRESS NOTES
Ov DR Mira Otoole follow up 1 mth post cardioversion. Will be having colonoscopy done on Friday 6/24  Per DR Fox pt is holding eliquis  No chest pain   occ sob   Not as much now since cardioversion. No ankle edema  No dizziness.   Cleared for colonoscopy     Stop metoprolol   Start cardiazem 120 mg daily   Do cardioversion in 2 weeks 7/13   Follow up in 1 mth after   With EKG

## 2022-06-22 NOTE — LETTER
Darius Perla M.D. [address] [place] [address] Khushbu Hebert   [phone]        2022        Mago Chambers MD  [address] [place] [address]    RE:   Nelly Hand  :  1955    Dear Dr. Shahram Blum:    [address]:  1. Atrial flutter. 2.  Status post cardioversion to normal sinus rhythm on 2022, with him reverting back to atrial flutter on an EKG on . 3.  Cleared for colonoscopy. HISTORY OF PRESENT ILLNESS:  I had the pleasure of seeing Shan Ramos in our office on 2022. I trust you received my full H and P from 2022. He had marked shortness of breath with exertion starting in 2021, which continued to worsen. He had mild LV dysfunction, EF of 45% to 50%. He was in atrial flutter and is anticoagulated with Eliquis. We did a cardioversion in which he converted to sinus rhythm on 2022. An EKG in preparation for today, however, showed he is back in atrial flutter with variable AV block. He remains asymptomatic. He denies chest pain. He has had some shortness of breath, although it is not quite as much since his cardioversion. He has had no pedal edema. MEDICATIONS:  His medications are metoprolol 25 mg daily, Eliquis 5 mg b.i.d., Lasix 20 mg daily, lisinopril 5 mg daily, Aldactone 25 mg daily. PHYSICAL EXAMINATION:  VITAL SIGNS:  His blood pressure was 112/70 with a heart rate of 90 and regular. Respiratory rate 18. O2 sat 95%. Weight 227 pounds. GENERAL:  He is a pleasant 15-year-old gentleman. Denied pain. He was oriented to person, place and time. Answered questions appropriately. SKIN:  No unusual skin changes. HEENT:  The pupils are equally round and intact. Mucous membranes were dry. NECK:  No JVD. Good carotid pulses. No carotid bruits. No lymphadenopathy or thyromegaly. CARDIOVASCULAR EXAM:  S1 and S2 were normal.  No S3 or S4.   Soft systolic blowing type murmur. No diastolic murmur. PMI was normal.  No lift, thrust, or pericardial friction rub. LUNGS:  Clear to auscultation and percussion. ABDOMEN:  Soft and nontender. Good bowel sounds. EXTREMITIES:  Good femoral pulses. Good pedal pulses. No pedal edema. We did a Lexiscan Cardiolite stress test on 05/09/2022; it was equivocal with a small amount of perfusion defect inferolaterally with TID. IMPRESSION:  1. Mildly abnormal Lexiscan stress test with inferior apical ischemia with transient ischemic dilatation. 2.  Cardioversion on 05/25/2022, to sinus rhythm, with him reverting back to atrial flutter with controlled ventricular response. 3.  Cleared for colonoscopy. PLAN:  1. Stop metoprolol. 2.  Start Cardizem  mg daily. 3.  Cardioversion in two weeks on 07/13/2022.  4.  Follow up in one month with an EKG. 5.  Discuss possible cardiac catheterization. DISCUSSION:  Mr. Bossman Jason reverted back to atrial flutter. He is asymptomatic and cannot feel that he is in atrial flutter. I will switch from metoprolol to Cardizem and we will cardiovert him again in two weeks. He is stable and we will proceed with the colonoscopy. I will meet with him after the cardioversion and we will discuss cardiac catheterization. Thank you very much for allowing me the privilege of seeing Mr. Bossman Jason. If you have any questions on my thoughts, please do not hesitate to contact me.     Sincerely,        Keshawn Pate    D: 06/27/2022 7:05:01     T: 06/27/2022 7:08:32     [email or web address]  Job#: [id number]   Doc#: [id number]

## 2022-06-22 NOTE — LETTER
Wale Kwong M.D. [address] [place] [address] [address] [phone]        2022        Malcom Burnett MD  [address] [place] [address]    RE:   Romana Juan  :  1955    Dear Dr. Hinkle Land:    [address]:  1. Atrial flutter. 2.  Status post cardioversion to normal sinus rhythm on 2022, with him reverting back to atrial flutter on an EKG on . 3.  Cleared for colonoscopy. HISTORY OF PRESENT ILLNESS:  I had the pleasure of seeing Keerthi Galeas in our office on 2022. I trust you received my full H and P from 2022. He had marked shortness of breath with exertion starting in 2021, which continued to worsen. He had mild LV dysfunction, EF of 45% to 50%. He was in atrial flutter and is anticoagulated with Eliquis. We did a cardioversion in which he converted to sinus rhythm on 2022. An EKG in preparation for today, however, showed he is back in atrial flutter with variable AV block. He remains asymptomatic. He denies chest pain. He has had some shortness of breath, although it is not quite as much since his cardioversion. He has had no pedal edema. MEDICATIONS:  His medications are metoprolol 25 mg daily, Eliquis 5 mg b.i.d., Lasix 20 mg daily, lisinopril 5 mg daily, Aldactone 25 mg daily. PHYSICAL EXAMINATION:  VITAL SIGNS:  His blood pressure was 112/70 with a heart rate of 90 and regular. Respiratory rate 18. O2 sat 95%. Weight 227 pounds. GENERAL:  He is a pleasant 27-year-old gentleman. Denied pain. He was oriented to person, place and time. Answered questions appropriately. SKIN:  No unusual skin changes. HEENT:  The pupils are equally round and intact. Mucous membranes were dry. NECK:  No JVD. Good carotid pulses. No carotid bruits. No lymphadenopathy or thyromegaly. CARDIOVASCULAR EXAM:  S1 and S2 were normal.  No S3 or S4.   Soft systolic blowing type murmur. No diastolic murmur. PMI was normal.  No lift, thrust, or pericardial friction rub. LUNGS:  Clear to auscultation and percussion. ABDOMEN:  Soft and nontender. Good bowel sounds. EXTREMITIES:  Good femoral pulses. Good pedal pulses. No pedal edema. We did a Lexiscan Cardiolite stress test on 05/09/2022; it was equivocal with a small amount of perfusion defect inferolaterally with TID. IMPRESSION:  1. Mildly abnormal Lexiscan stress test with inferior apical ischemia with transient ischemic dilatation. 2.  Cardioversion on 05/25/2022, to sinus rhythm, with him reverting back to atrial flutter with controlled ventricular response. 3.  Cleared for colonoscopy. PLAN:  1. Stop metoprolol. 2.  Start Cardizem  mg daily. 3.  Cardioversion in two weeks on 07/13/2022.  4.  Follow up in one month with an EKG. 5.  Discuss possible cardiac catheterization. DISCUSSION:  Mr. Kiley Cantu reverted back to atrial flutter. He is asymptomatic and cannot feel that he is in atrial flutter. I will switch from metoprolol to Cardizem and we will cardiovert him again in two weeks. He is stable and we will proceed with the colonoscopy. I will meet with him after the cardioversion and we will discuss cardiac catheterization. Thank you very much for allowing me the privilege of seeing Mr. Kiley Cantu. If you have any questions on my thoughts, please do not hesitate to contact me.     Sincerely,        Sarai Alcocer    D: 06/27/2022 7:05:01     T: 06/27/2022 7:08:32     [email or web address]  Job#: [id number]   Doc#: [id number]

## 2022-06-23 ENCOUNTER — HOSPITAL ENCOUNTER (OUTPATIENT)
Age: 67
Setting detail: SPECIMEN
Discharge: HOME | End: 2022-06-23
Payer: OTHER GOVERNMENT

## 2022-06-23 ENCOUNTER — PROCEDURE VISIT (OUTPATIENT)
Dept: FAMILY MEDICINE CLINIC | Age: 67
End: 2022-06-23
Payer: OTHER GOVERNMENT

## 2022-06-23 VITALS — HEIGHT: 69 IN | WEIGHT: 227 LBS | BODY MASS INDEX: 33.62 KG/M2

## 2022-06-23 DIAGNOSIS — L98.9 CHANGING SKIN LESION: ICD-10-CM

## 2022-06-23 DIAGNOSIS — D49.2 NEOPLASM OF SKIN OF LOWER LEG: ICD-10-CM

## 2022-06-23 DIAGNOSIS — A60.00 RECURRENT GENITAL HERPES: ICD-10-CM

## 2022-06-23 DIAGNOSIS — D23.9 ANGIOKERATOMA: Primary | ICD-10-CM

## 2022-06-23 PROCEDURE — 88305 TISSUE EXAM BY PATHOLOGIST: CPT

## 2022-06-23 PROCEDURE — 11301 SHAVE SKIN LESION 0.6-1.0 CM: CPT | Performed by: INTERNAL MEDICINE

## 2022-06-23 RX ORDER — VALACYCLOVIR HYDROCHLORIDE 500 MG/1
TABLET, FILM COATED ORAL
Qty: 30 TABLET | Refills: 1 | Status: SHIPPED | OUTPATIENT
Start: 2022-06-23

## 2022-06-23 NOTE — PROGRESS NOTES
Cheryl Little (:  1955) is a 79 y.o. male,Established patient, here for evaluation of the following chief complaint(s):  Procedure (Shave biopsy neoplasm skin left lower leg (1cm raised nodule, scabbe top). )         ASSESSMENT/PLAN:  1. Angiokeratoma  -     42271 - WA SHAV SKIN LES 6-10MM TRUNK,ARM,LEG  2. Neoplasm of skin of lower leg  -     Specimen to Pathology Outpatient; Future  3. Changing skin lesion  -     Specimen to Pathology Outpatient; Future  -     36749 - WA SHAV SKIN LES 6-10MM TRUNK,ARM,LEG  4. Recurrent genital herpes      Plan:  1. Neoplasm of skin of lower leg  The procedure was explained to Cristofer Sexton including the reason for the procedure as well as the possible complications. Informed consent was obtained. The area was prepped in a normal sterile fashion using betadine swabs x 3. The betadine was then cleared with alcohol swabs. Anesthesia was achieved injecting 2 ml of 1% epinephrine with epinephrine. The lesion was removed using a razor blade and sent for pathology. Hemostasis was achieved post procedure with the use of electrocaudery. Manjeet tolerated the procedure well without complication. Await pathology for diagnosis and further recommendations at that time. 2. Changing skin lesion      3. Recurrent genital herpes  Rx for Valtrex 500 mg two times a day x 3 days as needed. 4. Angiokeratoma  No further treatment at this time. Pathology:  ANGIOKERATOMA (no further treatment required). Subjective   SUBJECTIVE/OBJECTIVE:  Cristofer Sexton presents to have a shave biopsy on a lesion on the left lower leg. Lesion on the left lower leg that he has had x 3 months that won't heal.  He states he does scratch it often to get the scab off but does bleed a lot. No history of skin cancer in the past.  He has tried healing salve with no improvement.         Review of Systems       Objective   Physical Exam  Skin:     Comments: 1.5 cm raised nodule with central dimpling which appears black. An electronic signature was used to authenticate this note.     --Samara Hayes MD

## 2022-06-23 NOTE — PATIENT INSTRUCTIONS
Survey: You may be receiving a survey from [employer] regarding your visit today. You may get this in the mail, through your MyChart or in your email. Please complete the survey to enable us to provide the highest quality of care to you and your family. Please also, mention our names. If you cannot score us as very good (5 Stars) on any question, please feel free to call the office to discuss how we could have made your experience exceptional.      Thank You!         Dr. Higinio Naranjo MD    [facility], [address] [place] [address], NONA LuisN RN    [address]

## 2022-06-24 ENCOUNTER — HOSPITAL ENCOUNTER (OUTPATIENT)
Dept: PREADMISSION TESTING | Age: 67
Setting detail: SPECIMEN
Discharge: HOME | End: 2022-06-24
Payer: OTHER GOVERNMENT

## 2022-06-24 ENCOUNTER — HOSPITAL ENCOUNTER (OUTPATIENT)
Age: 67
Setting detail: OUTPATIENT SURGERY
Discharge: HOME | End: 2022-06-24
Attending: INTERNAL MEDICINE | Admitting: INTERNAL MEDICINE
Payer: OTHER GOVERNMENT

## 2022-06-24 ENCOUNTER — ANESTHESIA (OUTPATIENT)
Dept: ENDOSCOPY | Age: 67
End: 2022-06-24
Payer: OTHER GOVERNMENT

## 2022-06-24 VITALS
SYSTOLIC BLOOD PRESSURE: 138 MMHG | HEART RATE: 69 BPM | HEIGHT: 69 IN | DIASTOLIC BLOOD PRESSURE: 84 MMHG | BODY MASS INDEX: 33 KG/M2 | TEMPERATURE: 96.5 F | WEIGHT: 222.8 LBS | OXYGEN SATURATION: 92 % | RESPIRATION RATE: 14 BRPM

## 2022-06-24 LAB
SARS-COV-2, RAPID: DETECTED
SPECIMEN DESCRIPTION: ABNORMAL

## 2022-06-24 PROCEDURE — 2709999900 HC NON-CHARGEABLE SUPPLY: Performed by: INTERNAL MEDICINE

## 2022-06-24 PROCEDURE — 6370000000 HC RX 637 (ALT 250 FOR IP): Performed by: INTERNAL MEDICINE

## 2022-06-24 PROCEDURE — 3609010600 HC COLONOSCOPY POLYPECTOMY SNARE/COLD BIOPSY: Performed by: INTERNAL MEDICINE

## 2022-06-24 PROCEDURE — 87635 SARS-COV-2 COVID-19 AMP PRB: CPT

## 2022-06-24 PROCEDURE — 6360000002 HC RX W HCPCS: Performed by: NURSE ANESTHETIST, CERTIFIED REGISTERED

## 2022-06-24 PROCEDURE — 2500000003 HC RX 250 WO HCPCS: Performed by: NURSE ANESTHETIST, CERTIFIED REGISTERED

## 2022-06-24 PROCEDURE — C9803 HOPD COVID-19 SPEC COLLECT: HCPCS

## 2022-06-24 PROCEDURE — 7100000010 HC PHASE II RECOVERY - FIRST 15 MIN: Performed by: INTERNAL MEDICINE

## 2022-06-24 PROCEDURE — 88305 TISSUE EXAM BY PATHOLOGIST: CPT

## 2022-06-24 PROCEDURE — 7100000011 HC PHASE II RECOVERY - ADDTL 15 MIN: Performed by: INTERNAL MEDICINE

## 2022-06-24 PROCEDURE — 94664 DEMO&/EVAL PT USE INHALER: CPT

## 2022-06-24 PROCEDURE — 2580000003 HC RX 258: Performed by: INTERNAL MEDICINE

## 2022-06-24 PROCEDURE — 3700000000 HC ANESTHESIA ATTENDED CARE: Performed by: INTERNAL MEDICINE

## 2022-06-24 PROCEDURE — 3700000001 HC ADD 15 MINUTES (ANESTHESIA): Performed by: INTERNAL MEDICINE

## 2022-06-24 PROCEDURE — 45380 COLONOSCOPY AND BIOPSY: CPT | Performed by: INTERNAL MEDICINE

## 2022-06-24 RX ORDER — SODIUM CHLORIDE, SODIUM LACTATE, POTASSIUM CHLORIDE, CALCIUM CHLORIDE 600; 310; 30; 20 MG/100ML; MG/100ML; MG/100ML; MG/100ML
INJECTION, SOLUTION INTRAVENOUS CONTINUOUS
Status: DISCONTINUED | OUTPATIENT
Start: 2022-06-24 | End: 2022-06-24 | Stop reason: HOSPADM

## 2022-06-24 RX ORDER — ALBUTEROL SULFATE 90 UG/1
AEROSOL, METERED RESPIRATORY (INHALATION)
Status: DISCONTINUED
Start: 2022-06-24 | End: 2022-06-24 | Stop reason: HOSPADM

## 2022-06-24 RX ORDER — LEVALBUTEROL INHALATION SOLUTION 1.25 MG/3ML
SOLUTION RESPIRATORY (INHALATION)
Status: DISCONTINUED
Start: 2022-06-24 | End: 2022-06-24 | Stop reason: WASHOUT

## 2022-06-24 RX ORDER — MIDAZOLAM HYDROCHLORIDE 1 MG/ML
INJECTION INTRAMUSCULAR; INTRAVENOUS PRN
Status: DISCONTINUED | OUTPATIENT
Start: 2022-06-24 | End: 2022-06-24 | Stop reason: SDUPTHER

## 2022-06-24 RX ORDER — PROPOFOL 10 MG/ML
INJECTION, EMULSION INTRAVENOUS CONTINUOUS PRN
Status: DISCONTINUED | OUTPATIENT
Start: 2022-06-24 | End: 2022-06-24 | Stop reason: SDUPTHER

## 2022-06-24 RX ORDER — PROPOFOL 10 MG/ML
INJECTION, EMULSION INTRAVENOUS PRN
Status: DISCONTINUED | OUTPATIENT
Start: 2022-06-24 | End: 2022-06-24 | Stop reason: SDUPTHER

## 2022-06-24 RX ORDER — SODIUM CHLORIDE 0.9 % (FLUSH) 0.9 %
5-40 SYRINGE (ML) INJECTION PRN
Status: DISCONTINUED | OUTPATIENT
Start: 2022-06-24 | End: 2022-06-24 | Stop reason: HOSPADM

## 2022-06-24 RX ORDER — LEVALBUTEROL INHALATION SOLUTION 0.63 MG/3ML
0.63 SOLUTION RESPIRATORY (INHALATION) ONCE
Status: DISCONTINUED | OUTPATIENT
Start: 2022-06-24 | End: 2022-06-24 | Stop reason: HOSPADM

## 2022-06-24 RX ORDER — SODIUM CHLORIDE 0.9 % (FLUSH) 0.9 %
5-40 SYRINGE (ML) INJECTION EVERY 12 HOURS SCHEDULED
Status: DISCONTINUED | OUTPATIENT
Start: 2022-06-24 | End: 2022-06-24 | Stop reason: HOSPADM

## 2022-06-24 RX ORDER — SODIUM CHLORIDE 9 MG/ML
25 INJECTION, SOLUTION INTRAVENOUS PRN
Status: DISCONTINUED | OUTPATIENT
Start: 2022-06-24 | End: 2022-06-24 | Stop reason: HOSPADM

## 2022-06-24 RX ORDER — METOPROLOL TARTRATE 5 MG/5ML
INJECTION INTRAVENOUS PRN
Status: DISCONTINUED | OUTPATIENT
Start: 2022-06-24 | End: 2022-06-24 | Stop reason: SDUPTHER

## 2022-06-24 RX ORDER — LIDOCAINE HYDROCHLORIDE 10 MG/ML
INJECTION, SOLUTION EPIDURAL; INFILTRATION; INTRACAUDAL; PERINEURAL PRN
Status: DISCONTINUED | OUTPATIENT
Start: 2022-06-24 | End: 2022-06-24 | Stop reason: SDUPTHER

## 2022-06-24 RX ORDER — FENTANYL CITRATE 50 UG/ML
INJECTION, SOLUTION INTRAMUSCULAR; INTRAVENOUS PRN
Status: DISCONTINUED | OUTPATIENT
Start: 2022-06-24 | End: 2022-06-24 | Stop reason: SDUPTHER

## 2022-06-24 RX ORDER — ALBUTEROL SULFATE 90 UG/1
2 AEROSOL, METERED RESPIRATORY (INHALATION) ONCE
Status: COMPLETED | OUTPATIENT
Start: 2022-06-24 | End: 2022-06-24

## 2022-06-24 RX ADMIN — ALBUTEROL SULFATE 2 PUFF: 90 AEROSOL, METERED RESPIRATORY (INHALATION) at 07:13

## 2022-06-24 RX ADMIN — MIDAZOLAM 2 MG: 1 INJECTION INTRAMUSCULAR; INTRAVENOUS at 07:21

## 2022-06-24 RX ADMIN — METOROPROLOL TARTRATE 5 MG: 5 INJECTION, SOLUTION INTRAVENOUS at 07:33

## 2022-06-24 RX ADMIN — FENTANYL CITRATE 50 MCG: 50 INJECTION, SOLUTION INTRAMUSCULAR; INTRAVENOUS at 07:21

## 2022-06-24 RX ADMIN — PROPOFOL 50 MG: 10 INJECTION, EMULSION INTRAVENOUS at 07:28

## 2022-06-24 RX ADMIN — FENTANYL CITRATE 50 MCG: 50 INJECTION, SOLUTION INTRAMUSCULAR; INTRAVENOUS at 07:28

## 2022-06-24 RX ADMIN — PROPOFOL 30 MG: 10 INJECTION, EMULSION INTRAVENOUS at 07:36

## 2022-06-24 RX ADMIN — PROPOFOL 100 MCG/KG/MIN: 10 INJECTION, EMULSION INTRAVENOUS at 07:28

## 2022-06-24 RX ADMIN — LIDOCAINE HYDROCHLORIDE 50 MG: 10 INJECTION, SOLUTION EPIDURAL; INFILTRATION; INTRACAUDAL; PERINEURAL at 07:28

## 2022-06-24 RX ADMIN — SODIUM CHLORIDE, POTASSIUM CHLORIDE, SODIUM LACTATE AND CALCIUM CHLORIDE: 600; 310; 30; 20 INJECTION, SOLUTION INTRAVENOUS at 06:39

## 2022-06-24 ASSESSMENT — LIFESTYLE VARIABLES: SMOKING_STATUS: 1

## 2022-06-24 ASSESSMENT — PAIN - FUNCTIONAL ASSESSMENT: PAIN_FUNCTIONAL_ASSESSMENT: NONE - DENIES PAIN

## 2022-06-24 NOTE — H&P
History and Physical       Carlos Alberto Headley (:  1955) is a 79 y.o. male,Established patient, here for evaluation of the following chief complaint(s):  Surgical Consult (colonoscopy consult. Scheduled for 22. H/o adenomatous polyps. Last scope 16. )        ASSESSMENT/PLAN:  1. Colon cancer screening  -     COLONOSCOPY W/ OR W/O BIOPSY; Future  2. History of adenomatous polyp of colon  -     COLONOSCOPY W/ OR W/O BIOPSY; Future  3. Neoplasm of skin of lower leg        Plan:  1. Colon cancer screening  Set up for screening colonoscopy. Last colonoscopy with adenomatous polyp with the recommendation to repeat the colonoscopy in 5 years. Risk and benefits explained, informed consent obtained. The bowel prep was explained to Josef Gonzalez and he was instructed to start the prep the day before the procedure (printed directions were given). Avoid corn 1 week prior to the procedure as this can cause suctioning difficulties during the procedure. Avoid eating or drinking at least 8 hours prior to the procedure. Josef Gonzalez was encouraged to call the clinic if he has any questions prior to the procedure. Hold Eliquis 3 days before the procedure.     2. History of adenomatous polyp of colon        3. Neoplasm of skin of lower leg  Return for shave biopsy. Concerned about a basal or squamous cell carcinoma.       Follow up after the procedure.                Raudel   Manjeet a patient of [facility] and the [place] presents to be evaluated for a colonoscopy. Josef Gonzalez is 79 y.o. and has had a colonoscopy in the past.  There is  a history of colon polyps (last in 2016 with one adenomatous polyp). Currently Josef Gonzalez denies rectal bleeding, denies bowel habit changes, and denies abdominal pain. There is not a family history of colon cancer.     Lesion on the left lower leg that he has had x 3 months that won't heal.  He states he does scratch it often to get the scab off but does bleed a lot.   No history of skin cancer in the past. He has tried healing salve with no improvement. No current facility-administered medications on file prior to encounter. Current Outpatient Medications on File Prior to Encounter   Medication Sig Dispense Refill    Cholecalciferol (VITAMIN D PO) Take 2,000 Units by mouth        No Known Allergies    Past Medical History:   Diagnosis Date    Hyperlipidemia     Primary hypertension        Past Surgical History:   Procedure Laterality Date    ACHILLES TENDON SURGERY      APPENDECTOMY      COLONOSCOPY  09/13/2012    4 adenomatous polyps    COLONOSCOPY  07/19/2016    1 tubular adenoma    HERNIA REPAIR      TONSILLECTOMY      VASCULAR SURGERY         Social History     Socioeconomic History    Marital status:      Spouse name: Not on file    Number of children: Not on file    Years of education: Not on file    Highest education level: Not on file   Occupational History    Not on file   Tobacco Use    Smoking status: Current Every Day Smoker     Packs/day: 1.00     Years: 40.00     Pack years: 40.00     Types: Cigarettes    Smokeless tobacco: Never Used   Substance and Sexual Activity    Alcohol use: Yes     Alcohol/week: 12.0 standard drinks     Types: 12 Cans of beer per week     Comment: per week    Drug use: Not on file    Sexual activity: Not on file   Other Topics Concern    Not on file   Social History Narrative    Not on file     Social Determinants of Health     Financial Resource Strain: Low Risk     Difficulty of Paying Living Expenses: Not very hard   Food Insecurity: No Food Insecurity    Worried About Running Out of Food in the Last Year: Never true    Inderjit of Food in the Last Year: Never true   Transportation Needs:     Lack of Transportation (Medical): Not on file    Lack of Transportation (Non-Medical):  Not on file   Physical Activity:     Days of Exercise per Week: Not on file    Minutes of Exercise per Session: Not on file   Stress:     Feeling of Stress : Not on file   Social Connections:     Frequency of Communication with Friends and Family: Not on file    Frequency of Social Gatherings with Friends and Family: Not on file    Attends [employer] Services: Not on file    Active Member of [address] [place] [address] [employer] or Organizations: Not on file    Attends Club or Organization Meetings: Not on file    Marital Status: Not on file   Intimate Partner Violence:     Fear of Current or Ex-Partner: Not on file    Emotionally Abused: Not on file    Physically Abused: Not on file    Sexually Abused: Not on file   Housing Stability:     Unable to Pay for Housing in the Last Year: Not on file    Number of [place] in the Last Year: Not on file    Unstable Housing in the Last Year: Not on file       Family History   Problem Relation Age of Onset    No Known Problems Mother        No current facility-administered medications on file prior to encounter. Current Outpatient Medications on File Prior to Encounter   Medication Sig Dispense Refill    Cholecalciferol (VITAMIN D PO) Take 2,000 Units by mouth          No Known Allergies      Lab Results   Component Value Date    K 4.7 10/24/2019    CREATININE 1.2 10/24/2019       Lab Results   Component Value Date    LABA1C 6.7 04/13/2022     No results found for: EAG    Lab Results   Component Value Date    CHOL 167 04/13/2022    CHOL 184 10/24/2019    CHOL 176 03/08/2012     Lab Results   Component Value Date    TRIG 145 04/13/2022    TRIG 87 10/24/2019    TRIG 117 03/08/2012     Lab Results   Component Value Date    HDL 33 (A) 04/13/2022    HDL 45 10/24/2019    HDL 46 03/08/2012     Lab Results   Component Value Date    LDLCALC 132 04/13/2022    LDLCALC 116 10/24/2019    LDLCALC 107 03/08/2012     Lab Results   Component Value Date    VLDL 23 03/08/2012     Lab Results   Component Value Date    CHOLHDLRATIO 3.8 03/08/2012                         Review of Systems   Constitutional: Negative.     HENT: Negative for congestion, ear pain, postnasal drip, sneezing and sore throat. Eyes: Negative for visual disturbance. Respiratory: Negative. Cardiovascular: Negative for chest pain, palpitations and leg swelling. Gastrointestinal: Negative for abdominal pain, blood in stool, constipation, diarrhea and nausea. Genitourinary: Negative for difficulty urinating, dysuria, frequency and urgency. Musculoskeletal: Negative for arthralgias, joint swelling, myalgias, neck pain and neck stiffness. Skin: Negative. Neurological: Negative for syncope. Psychiatric/Behavioral: Negative.          Objective   Physical Exam  Vitals and nursing note reviewed. Constitutional:       Appearance: He is well-developed. HENT:      Head: Atraumatic. Eyes:      Conjunctiva/sclera: Conjunctivae normal.   Cardiovascular:      Rate and Rhythm: Normal rate and regular rhythm. Heart sounds: Murmur heard.        Pulmonary:      Effort: Pulmonary effort is normal.      Breath sounds: Normal breath sounds. Abdominal:      Palpations: Abdomen is soft. Tenderness: There is no abdominal tenderness. Musculoskeletal:      Cervical back: Normal range of motion and neck supple. Lymphadenopathy:      Cervical: No cervical adenopathy. Skin:     Findings: No rash. Comments: Left lower leg with a 1 cm raised nodule with a scabbed over top. Neurological:      Mental Status: He is alert. Psychiatric:         Behavior: Behavior normal.         Thought Content: Thought content normal.       6/24/22:  H&P reviewed, no change in medication.

## 2022-06-24 NOTE — BRIEF OP NOTE
Brief Postoperative Note      Patient: Laura Stephenson  YOB: 1955  MRN: [id number]    Date of Procedure: 6/24/2022    Pre-Op Diagnosis: HX OF ADENOMAS, POLYP    Post-Op Diagnosis:  Rectal polyps x 3     Sigmoid colon polyp     Descending colon polyp     Transverse colon polyps x 5     Ascending colon polyps x 2     External hemorrhoids        Procedure(s):  COLONOSCOPY to the Cecum. Rectal polypectomy x 3 with cold cup biopsy forceps. Sigmoid colon polypectomy with cold cup biopsy forceps. Descending colon polypectomy with cold cup biopsy forceps. Transverse colon polypectomy x 5 with cold cup biopsy forceps. Ascending colon polypectomy x 2 with cold cup biopsy forceps. Surgeon(s):  Ruma Hodge MD    Assistant:  Jesse Kolb CST    Anesthesia: Madelaine Brink CRNA. MAC anesthesia    Estimated Blood Loss (mL): < 5 ml    Complications: None    Specimens:   ID Type Source Tests Collected by Time Destination   A : A. Transverse colon polyp Tissue Colon-Transverse SURGICAL PATHOLOGY Ruma Hodge MD 6/24/2022 0059    B : B. Transverse colon polyp #2 & #3 Tissue Colon-Transverse SURGICAL PATHOLOGY Ruma Hodge MD 6/24/2022 0734    C : C. Transverse colon polyp #4 Tissue Colon-Transverse SURGICAL PATHOLOGY Ruma Hodge MD 6/24/2022 5974    D : D. Ascending colon polyp x 2 Tissue Colon-Ascending SURGICAL PATHOLOGY Ruma Hodge MD 6/24/2022 0745    E : E. Transverse colon polyp Tissue Colon-Transverse SURGICAL PATHOLOGY Ruma Hodge MD 6/24/2022 2050    F : F. Descending colon polyp Tissue Colon-Descending SURGICAL PATHOLOGY uRma Hodge MD 6/24/2022 8851    G : G. Sigmoid colon polyp Tissue Sigmoid Colon SURGICAL PATHOLOGY Ruma Hodge MD 6/24/2022 0759    H : H. Rectal polyp Tissue Rectum SURGICAL PATHOLOGY Ruma Hodge MD 6/24/2022 0894    I : I.  Rectal polyp x 2 Tissue Rectum SURGICAL PATHOLOGY Ruma Hodge MD 6/24/2022 8137        Implants:  * No implants in log *      Drains: * No LDAs found *    Electronically signed by Marco Mooney MD on 6/24/2022 at 8:12 AM

## 2022-06-24 NOTE — ANESTHESIA PRE PROCEDURE
Department of Anesthesiology  Preprocedure Note       Name:  Thee Ramos   Age:  79 y.o.  :  1955                                          MRN:  [id number]         Date:  2022      Surgeon: Savanna Hinds):  Perla Fox MD    Procedure: Procedure(s):  COLONOSCOPY POLYPECTOMY SNARE/COLD BIOPSY    Medications prior to admission:   Prior to Admission medications    Medication Sig Start Date End Date Taking? Authorizing Provider   valACYclovir (VALTREX) 500 MG tablet Take 1 tablet by mouth two times a day x 3 days as needed.  22   Truman Fox MD   dilTIAZem (CARDIZEM CD) 120 MG extended release capsule Take 1 capsule by mouth daily 22   Moon Baez MD   Omega-3 1000 MG CAPS Take by mouth 4 tabs bid    Historical Provider, MD   apixaban (ELIQUIS) 5 MG TABS tablet Take by mouth 2 times daily  Patient not taking: Reported on 2022    Historical Provider, MD   furosemide (LASIX) 20 MG tablet Take 20 mg by mouth daily    Historical Provider, MD   lisinopril (PRINIVIL;ZESTRIL) 5 MG tablet Take 1 tablet by mouth daily 22   Moon Baez MD   spironolactone (ALDACTONE) 25 MG tablet Take 1 tablet by mouth daily 22   Moon Baez MD   Cholecalciferol (VITAMIN D PO) Take 2,000 Units by mouth     Historical Provider, MD       Current medications:    Current Facility-Administered Medications   Medication Dose Route Frequency Provider Last Rate Last Admin    0.9 % sodium chloride infusion  25 mL IntraVENous PRN Truman Fox MD        lactated ringers infusion   IntraVENous Continuous Truman Fox MD 75 mL/hr at 22 0722 NoRateChange at 22 0259    sodium chloride flush 0.9 % injection 5-40 mL  5-40 mL IntraVENous 2 times per day Truman Fox MD        sodium chloride flush 0.9 % injection 5-40 mL  5-40 mL IntraVENous PRN Truman Fox MD        levalbuterol (XOPENEX) nebulization 0.63 mg  0.63 mg Nebulization Once Perla Fox MD         Facility-Administered Medications Ordered in Other from Last 3 Encounters:   06/24/22 222 lb 12.8 oz (101.1 kg)   06/23/22 227 lb (103 kg)   06/22/22 227 lb (103 kg)     Body mass index is 32.9 kg/m². CBC: No results found for: WBC, RBC, HGB, HCT, MCV, RDW, PLT    CMP:   Lab Results   Component Value Date    K 4.7 10/24/2019    CREATININE 1.2 10/24/2019       POC Tests: No results for input(s): POCGLU, POCNA, POCK, POCCL, POCBUN, POCHEMO, POCHCT in the last 72 hours. Coags: No results found for: PROTIME, INR, APTT    HCG (If Applicable): No results found for: PREGTESTUR, PREGSERUM, HCG, HCGQUANT     ABGs: No results found for: PHART, PO2ART, WUM1JUF, MCN2DHF, BEART, I4JJFZWT     Type & Screen (If Applicable):  No results found for: LABABO, LABRH    Drug/Infectious Status (If Applicable):  No results found for: HIV, HEPCAB    COVID-19 Screening (If Applicable):   Lab Results   Component Value Date    COVID19 DETECTED 06/24/2022           Anesthesia Evaluation  Patient summary reviewed and Nursing notes reviewed  Airway: Mallampati: II  TM distance: >3 FB   Neck ROM: full  Mouth opening: > = 3 FB   Dental: normal exam         Pulmonary:normal exam    (+) current smoker          Patient smoked on day of surgery. ROS comment: Newly positive Covid diagnosis with test this morning. Patient denies any symptoms and says his morning cough has to do with his smoking history. To receive a breathing treatment prior to procedure   Cardiovascular:  Exercise tolerance: no interval change,   (+) hypertension:, hyperlipidemia      ECG reviewed  Rhythm: irregular  Rate: normal                 ROS comment: Recent;ly cardioverted and back in aflutter. Rate controlled at 70. Rescheduled for cardioversion in July     Neuro/Psych:   Negative Neuro/Psych ROS              GI/Hepatic/Renal:   (+) GERD: no interval change, bowel prep,           Endo/Other: Negative Endo/Other ROS                    Abdominal:   (+) obese,     Abdomen: soft.       Vascular: negative vascular ROS. Other Findings:           Anesthesia Plan      MAC     ASA 3       Induction: intravenous. Anesthetic plan and risks discussed with patient. Plan discussed with attending.                     Florinda Soulier, APRN - CRNA   6/24/2022

## 2022-06-24 NOTE — ANESTHESIA POSTPROCEDURE EVALUATION
Department of Anesthesiology  Postprocedure Note    Patient: Memo Rivera  MRN: [id number]  YOB: 1955  Date of evaluation: 6/24/2022      Procedure Summary     Date: 06/24/22 Room / Location: [address] [place] [address] / [facility]    Anesthesia Start: 0722 Anesthesia Stop: 0930    Procedure: COLONOSCOPY POLYPECTOMY SNARE/COLD BIOPSY (N/A Abdomen) Diagnosis: (HX OF ADENOMAS, POLYP)    Surgeons: Lucy Christianson MD Responsible Provider: ARILEA Arshad CRNA    Anesthesia Type: MAC ASA Status: 3          Anesthesia Type: No value filed.     Antony Phase I: Antony Score: 10    Antony Phase II: Antony Score: 10      Anesthesia Post Evaluation    Patient location during evaluation: PACU  Patient participation: complete - patient participated  Level of consciousness: awake and alert  Pain score: 0  Airway patency: patent  Nausea & Vomiting: no nausea and no vomiting  Complications: no  Cardiovascular status: blood pressure returned to baseline and hemodynamically stable  Respiratory status: acceptable, room air and spontaneous ventilation  Hydration status: euvolemic  Multimodal analgesia pain management approach

## 2022-06-24 NOTE — PROGRESS NOTES

## 2022-06-24 NOTE — OP NOTE
[place] [address]                                OPERATIVE REPORT    PATIENT NAME: Hermann Sanchez                    :        1955  MED REC NO:   [id number]                              ROOM:  ACCOUNT NO:   [de-identified]                           ADMIT DATE: 2022  PROVIDER:     Leo Fox    DATE OF PROCEDURE:  2022    VIDEO-ASSISTED COLONOSCOPY REPORT    ATTENDING PHYSICIAN:  Sita Rosales MD    PRIMARY CARE PROVIDER:  Sita Rosales MD    PROCEDURES:  1.  Video-assisted colonoscopy to the cecum. 2.  Rectal polypectomy x3 with cold cup biopsy forceps. 3.  Sigmoid colon polypectomy with cold cup biopsy forceps. 4.  Descending colon polypectomy with cold cup biopsy forceps. 5.  Transverse colon polypectomy x5 with cold cup biopsy forceps. 6.  Ascending colon polypectomy x2 with cold cup biopsy forceps. PREOPERATIVE DIAGNOSIS:  History of adenomatous colon polyps. POSTOPERATIVE DIAGNOSES:  1. Rectal polyps x3.  2.  Sigmoid colon polyp. 3.  Descending colon polyp. 4.  Transverse colon polyps x5.  5.  Ascending colon polyps x2.  6.  External hemorrhoids. ANESTHESIA:  Per Ramy Dennis CRNA; MAC anesthetic. ASSISTANT:  Yamilka Hicks CST    PHYSICIAN:  Sita Rosales MD    COMPLICATIONS:  None. ESTIMATED BLOOD LOSS:  Less than 5 mL. PROCEDURE NOTE:  The patient was brought to the operating room where  procedure was explained along with possible complications and informed  consent was obtained. He was then taken to the minor procedure room  where continuous cardiopulmonary monitoring was placed along with O2 via  simple mask. The patient was placed flat in bed, positioned on his left  side, made comfortable, and IV sedation was given per Ramy Dennis CRNA. Once the patient was adequately sedated, digital rectal exam was  performed. No masses were palpated. Prostate was just palpable at the  tip of my finger and felt to be mildly enlarged, but an adequate  prostate exam was not performed secondary to not being able to palpate  the entire prostate. Colonoscope was inserted into the rectum with CO2  insufflation being performed. Scope was slowly advanced up the sigmoid  colon through the descending colon to the splenic flexure. Upon first  pass through the splenic flexure, no abnormalities were noted. Scope  was then advanced into the transverse colon where a total of five  sessile polyps were identified. Photodocumentation of each of the  polyps were taken. Most of these polyps were 7 mm in size. With use of  cold cup biopsy forceps, each of the polyps were removed with good  hemostasis obtained postpolypectomy, photodocumentation was taken  postpolypectomy. Scope was then advanced past the hepatic flexure down  the ascending colon where sessile polyps were identified. Photodocumentation was taken. With use of cold cup biopsy forceps, this  polyp was removed with good hemostasis obtained postpolypectomy,  photodocumentation was taken postpolypectomy. Scope was then advanced  into the cecum where photodocumentation of the cecum as well as the  ileocecal valve was taken. Placement of the colonoscope into the cecum  was also verified with transillumination through the abdominal wall. Scope was slowly withdrawn back up the ascending colon past the hepatic  flexure into the transverse colon. Upon second pass through the  transverse colon, no abnormalities were noted. Previous polypectomy  sites were observed again noting good hemostasis. Scope was withdrawn  past the splenic flexure down into the descending colon where a sessile  polyp was identified. Photodocumentation was taken. With use of cold  cup biopsy forceps, this polyp was removed with good hemostasis obtained  postpolypectomy, photodocumentation was taken postpolypectomy.   Scope  was withdrawn back into the sigmoid colon where a sessile polyp was  identified, approximately 8 mm in size. Photodocumentation was taken. With use of cold cup biopsy forceps, this polyp was removed with good  hemostasis obtained postpolypectomy, photodocumentation was taken  postpolypectomy. Scope was withdrawn back into the rectum where two  sessile polyps were identified, each of the polyps were approximately 6  mm in size, photodocumentation was taken. With use of cold cup biopsy  forceps, each of the polyps were removed with good hemostasis obtained  postpolypectomy, photodocumentation was taken postpolypectomy. Scope  was then retroflexed upon itself. In the retroflexed position, an  additional sessile polyp was identified, approximately 6 mm in size,  photodocumentation was taken. With use of cold cup biopsy forceps, this  polyp was removed with two bites with good hemostasis obtained  postpolypectomy, photodocumentation was taken postpolypectomy. Colonoscope was then straightened, suctioning was applied to remove  excess air and the scope was withdrawn. The patient tolerated the  procedure well without complications. He was taken to the recovery room  for further monitoring. He will be instructed to follow up with Dr. Afia Maya in 2 weeks to follow up with the pathology of the polyps.         ORALIA DOMINGUEZ    D: 06/24/2022 8:28:10       T: 06/24/2022 10:49:29     [email or web address]  Job#: [id number]     Doc#: [id number]    CC:

## 2022-06-27 LAB
DERMATOLOGY PATHOLOGY REPORT: NORMAL
SURGICAL PATHOLOGY REPORT: NORMAL

## 2022-07-01 ENCOUNTER — TELEPHONE (OUTPATIENT)
Dept: FAMILY MEDICINE CLINIC | Age: 67
End: 2022-07-01

## 2022-07-01 DIAGNOSIS — L03.116 LEFT LEG CELLULITIS: Primary | ICD-10-CM

## 2022-07-01 RX ORDER — CEPHALEXIN 500 MG/1
500 CAPSULE ORAL 3 TIMES DAILY
Qty: 21 CAPSULE | Refills: 0 | Status: SHIPPED | OUTPATIENT
Start: 2022-07-01 | End: 2022-08-18

## 2022-07-01 NOTE — TELEPHONE ENCOUNTER
Since I can't see him today I sent in Keflex for possible infection. I would have him keep it open to air unless he is going to be in an area that is dirty. Doesn't have to use Neosporin with being on Keflex.

## 2022-07-01 NOTE — TELEPHONE ENCOUNTER
Pt states the incision is red and infected, was told to put neosporin on it. He states that is not helping. He put alcohol on it  And it seems to be helping. He is asking if its ok to still do that? Or will he need an antibiotic?

## 2022-07-04 NOTE — PROGRESS NOTES
Simon Rubio M.D. [address] [place] [address] Khushbu Hebert [address]  [phone]        2022        Valentina Arroyo MD  [address] [place] [address]    RE:   Deo Jeffery  :  1955    Dear Dr. Carmona Lun [facility] [address]:  1. Atrial flutter. 2.  Status post cardioversion to normal sinus rhythm on 2022, with him reverting back to atrial flutter on an EKG on . 3.  Cleared for colonoscopy. HISTORY OF PRESENT ILLNESS:  I had the pleasure of seeing Samaria Woodward in our office on 2022. I trust you received my full H and P from 2022. He had marked shortness of breath with exertion starting in 2021, which continued to worsen. He had mild LV dysfunction, EF of 45% to 50%. He was in atrial flutter and is anticoagulated with Eliquis. We did a cardioversion in which he converted to sinus rhythm on 2022. An EKG in preparation for today, however, showed he is back in atrial flutter with variable AV block. He remains asymptomatic. He denies chest pain. He has had some shortness of breath, although it is not quite as much since his cardioversion. He has had no pedal edema. MEDICATIONS:  His medications are metoprolol 25 mg daily, Eliquis 5 mg b.i.d., Lasix 20 mg daily, lisinopril 5 mg daily, Aldactone 25 mg daily. PHYSICAL EXAMINATION:  VITAL SIGNS:  His blood pressure was 112/70 with a heart rate of 90 and regular. Respiratory rate 18. O2 sat 95%. Weight 227 pounds. GENERAL:  He is a pleasant 59-year-old gentleman. Denied pain. He was oriented to person, place and time. Answered questions appropriately. SKIN:  No unusual skin changes. HEENT:  The pupils are equally round and intact. Mucous membranes were dry. NECK:  No JVD. Good carotid pulses. No carotid bruits. No lymphadenopathy or thyromegaly. CARDIOVASCULAR EXAM:  S1 and S2 were normal.  No S3 or S4.   Soft systolic blowing type murmur. No diastolic murmur. PMI was normal.  No lift, thrust, or pericardial friction rub. LUNGS:  Clear to auscultation and percussion. ABDOMEN:  Soft and nontender. Good bowel sounds. EXTREMITIES:  Good femoral pulses. Good pedal pulses. No pedal edema. We did a Lexiscan Cardiolite stress test on 05/09/2022; it was equivocal with a small amount of perfusion defect inferolaterally with TID. IMPRESSION:  1. Mildly abnormal Lexiscan stress test with inferior apical ischemia with transient ischemic dilatation. 2.  Cardioversion on 05/25/2022, to sinus rhythm, with him reverting back to atrial flutter with controlled ventricular response. 3.  Cleared for colonoscopy. PLAN:  1. Stop metoprolol. 2.  Start Cardizem  mg daily. 3.  Cardioversion in two weeks on 07/13/2022.  4.  Follow up in one month with an EKG. 5.  Discuss possible cardiac catheterization. DISCUSSION:  Mr. Aris Hansen reverted back to atrial flutter. He is asymptomatic and cannot feel that he is in atrial flutter. I will switch from metoprolol to Cardizem and we will cardiovert him again in two weeks. He is stable and we will proceed with the colonoscopy. I will meet with him after the cardioversion and we will discuss cardiac catheterization. Thank you very much for allowing me the privilege of seeing Mr. Aris Hansen. If you have any questions on my thoughts, please do not hesitate to contact me.     Sincerely,        Haley Roberts    D: 06/27/2022 7:05:01     T: 06/27/2022 7:08:32     [email or web address]  Job#: [id number]   Doc#: [id number]

## 2022-07-13 ENCOUNTER — HOSPITAL ENCOUNTER (OUTPATIENT)
Dept: NON INVASIVE DIAGNOSTICS | Age: 67
Discharge: HOME | End: 2022-07-13
Payer: OTHER GOVERNMENT

## 2022-07-13 VITALS
DIASTOLIC BLOOD PRESSURE: 93 MMHG | SYSTOLIC BLOOD PRESSURE: 135 MMHG | HEART RATE: 85 BPM | RESPIRATION RATE: 14 BRPM | OXYGEN SATURATION: 97 %

## 2022-07-13 DIAGNOSIS — I48.92 ATRIAL FLUTTER, UNSPECIFIED TYPE (HCC): ICD-10-CM

## 2022-07-13 PROCEDURE — 92960 CARDIOVERSION ELECTRIC EXT: CPT

## 2022-07-13 PROCEDURE — 92960 CARDIOVERSION ELECTRIC EXT: CPT | Performed by: INTERNAL MEDICINE

## 2022-07-13 PROCEDURE — 2580000003 HC RX 258: Performed by: INTERNAL MEDICINE

## 2022-07-13 PROCEDURE — 6360000002 HC RX W HCPCS: Performed by: INTERNAL MEDICINE

## 2022-07-13 PROCEDURE — 93005 ELECTROCARDIOGRAM TRACING: CPT | Performed by: INTERNAL MEDICINE

## 2022-07-13 RX ORDER — PROPOFOL 10 MG/ML
INJECTION, EMULSION INTRAVENOUS
Status: COMPLETED | OUTPATIENT
Start: 2022-07-13 | End: 2022-07-13

## 2022-07-13 RX ORDER — SODIUM CHLORIDE 450 MG/100ML
INJECTION, SOLUTION INTRAVENOUS CONTINUOUS PRN
Status: COMPLETED | OUTPATIENT
Start: 2022-07-13 | End: 2022-07-13

## 2022-07-13 RX ADMIN — SODIUM CHLORIDE 100 ML/HR: 4.5 INJECTION, SOLUTION INTRAVENOUS at 08:17

## 2022-07-13 RX ADMIN — PROPOFOL 120 MG: 10 INJECTION, EMULSION INTRAVENOUS at 08:29

## 2022-07-13 NOTE — PRE SEDATION
Sedation Pre-Procedure Note    Patient Name: Regina Cline   YOB: 1955  Room/Bed: Room/bed info not found  Medical Record Number: [id number]  Date: 7/13/2022   Time: 8:00 AM       Indication:  Atrial fibrillation    Consent: I have discussed with the patient and/or the patient representative the indication, alternatives, and the possible risks and/or complications of the planned procedure and the anesthesia methods. The patient and/or patient representative appear to understand and agree to proceed. Vital Signs: There were no vitals filed for this visit. Past Medical History:   has a past medical history of Hyperlipidemia and Primary hypertension. Past Surgical History:   has a past surgical history that includes Colonoscopy (09/13/2012); Appendectomy; Tonsillectomy; vascular surgery; hernia repair; Colonoscopy (07/19/2016); Achilles tendon surgery; and Colonoscopy (N/A, 06/24/2022). Medications:   Scheduled Meds:   Continuous Infusions:   PRN Meds:   Home Meds:   Prior to Admission medications    Medication Sig Start Date End Date Taking? Authorizing Provider   cephALEXin (KEFLEX) 500 MG capsule Take 1 capsule by mouth 3 times daily 7/1/22   Truman Fox MD   valACYclovir (VALTREX) 500 MG tablet Take 1 tablet by mouth two times a day x 3 days as needed.  6/23/22   Truman Fox MD   dilTIAZem (CARDIZEM CD) 120 MG extended release capsule Take 1 capsule by mouth daily 6/22/22   Victor Hugo Bonilla MD   [place]-3 1000 MG CAPS Take by mouth 4 tabs bid    Historical Provider, MD   apixaban (ELIQUIS) 5 MG TABS tablet Take by mouth 2 times daily  Patient not taking: Reported on 6/22/2022    Historical Provider, MD   furosemide (LASIX) 20 MG tablet Take 20 mg by mouth daily    Historical Provider, MD   lisinopril (PRINIVIL;ZESTRIL) 5 MG tablet Take 1 tablet by mouth daily 5/4/22   Victor Hugo Bonilla MD   spironolactone (ALDACTONE) 25 MG tablet Take 1 tablet by mouth daily 5/4/22   Victor Hugo Bonilla MD Cholecalciferol (VITAMIN D PO) Take 2,000 Units by mouth     Historical Provider, MD     Coumadin Use Last 7 Days:  no  Antiplatelet drug therapy use last 7 days: no  Other anticoagulant use last 7 days: yes - eliquis    Additional Medication Information:       Pre-Sedation Documentation and Exam:   I have personally completed a history, physical exam & review of systems for this patient (see notes).     Mallampati Airway Assessment:  normal    Prior History of Anesthesia Complications:   none    ASA Classification:  Class 1 - A normal healthy patient    Sedation/ Anesthesia Plan:   intravenous sedation    Medications Planned:   propofol intravenously    Patient is an appropriate candidate for plan of sedation: yes      Electronically signed by Chele Pryor MD on 7/13/2022 at 8:00 AM

## 2022-07-13 NOTE — H&P
Nikita Yanez M.D. [address] [place] [address] Khushbu Hebert   [phone]           2022           Sim Olivo MD  [address] [place] [address]     RE:   Abigail Conte  :  1955     Dear Dr. Faiza Jernigan:     CHIEF COMPLAINT:  1. Atrial flutter. 2.  Status post cardioversion to normal sinus rhythm on 2022, with him reverting back to atrial flutter on an EKG on . 3.  Cleared for colonoscopy.     HISTORY OF PRESENT ILLNESS:  I had the pleasure of seeing Yennifer Valentine in our office on 2022. I trust you received my full H and P from 2022. He had marked shortness of breath with exertion starting in 2021, which continued to worsen. He had mild LV dysfunction, EF of 45% to 50%. He was in atrial flutter and is anticoagulated with Eliquis. We did a cardioversion in which he converted to sinus rhythm on 2022. An EKG in preparation for today, however, showed he is back in atrial flutter with variable AV block. He remains asymptomatic.      He denies chest pain. He has had some shortness of breath, although it is not quite as much since his cardioversion. He has had no pedal edema.     MEDICATIONS:  His medications are metoprolol 25 mg daily, Eliquis 5 mg b.i.d., Lasix 20 mg daily, lisinopril 5 mg daily, Aldactone 25 mg daily.     PHYSICAL EXAMINATION:  VITAL SIGNS:  His blood pressure was 112/70 with a heart rate of 90 and regular. Respiratory rate 18. O2 sat 95%. Weight 227 pounds. GENERAL:  He is a pleasant 59-year-old gentleman. Denied pain. He was oriented to person, place and time. Answered questions appropriately. SKIN:  No unusual skin changes. HEENT:  The pupils are equally round and intact. Mucous membranes were dry. NECK:  No JVD. Good carotid pulses. No carotid bruits. No lymphadenopathy or thyromegaly. CARDIOVASCULAR EXAM:  S1 and S2 were normal.  No S3 or S4.   Soft systolic blowing type murmur. No diastolic murmur. PMI was normal.  No lift, thrust, or pericardial friction rub. LUNGS:  Clear to auscultation and percussion. ABDOMEN:  Soft and nontender. Good bowel sounds. EXTREMITIES:  Good femoral pulses. Good pedal pulses. No pedal edema.     We did a Lexiscan Cardiolite stress test on 05/09/2022; it was equivocal with a small amount of perfusion defect inferolaterally with TID.     IMPRESSION:  1. Mildly abnormal Lexiscan stress test with inferior apical ischemia with transient ischemic dilatation. 2.  Cardioversion on 05/25/2022, to sinus rhythm, with him reverting back to atrial flutter with controlled ventricular response. 3.  Cleared for colonoscopy.     PLAN:  1. Stop metoprolol. 2.  Start Cardizem  mg daily. 3.  Cardioversion in two weeks on 07/13/2022.  4.  Follow up in one month with an EKG. 5.  Discuss possible cardiac catheterization.     DISCUSSION:  Mr. Lindsay Muniz reverted back to atrial flutter. He is asymptomatic and cannot feel that he is in atrial flutter.     I will switch from metoprolol to Cardizem and we will cardiovert him again in two weeks.     He is stable and we will proceed with the colonoscopy. I will meet with him after the cardioversion and we will discuss cardiac catheterization.     Thank you very much for allowing me the privilege of seeing Mr. Lindsay Muniz.   If you have any questions on my thoughts, please do not hesitate to contact me.     Sincerely,           Luciano Sprague

## 2022-07-13 NOTE — SEDATION DOCUMENTATION
Testing/procedure complete - patient tolerated well. Patient verbalized understanding of discharge instructions.

## 2022-07-14 LAB
EKG ATRIAL RATE: 271 BPM
EKG ATRIAL RATE: 81 BPM
EKG P AXIS: -93 DEGREES
EKG P AXIS: 63 DEGREES
EKG P-R INTERVAL: 162 MS
EKG Q-T INTERVAL: 386 MS
EKG Q-T INTERVAL: 402 MS
EKG QRS DURATION: 104 MS
EKG QRS DURATION: 86 MS
EKG QTC CALCULATION (BAZETT): 436 MS
EKG QTC CALCULATION (BAZETT): 448 MS
EKG R AXIS: 76 DEGREES
EKG R AXIS: 81 DEGREES
EKG T AXIS: 67 DEGREES
EKG T AXIS: 75 DEGREES
EKG VENTRICULAR RATE: 71 BPM
EKG VENTRICULAR RATE: 81 BPM

## 2022-07-14 PROCEDURE — 93010 ELECTROCARDIOGRAM REPORT: CPT | Performed by: INTERNAL MEDICINE

## 2022-07-17 NOTE — PROCEDURES
[place] [address]                            CARDIAC CATHETERIZATION    PATIENT NAME: Mihcael ARNOLD                    :        1955  MED REC NO:   [id number]                              ROOM:  ACCOUNT NO:   [de-identified]                           ADMIT DATE: 2022  PROVIDER:     Lm Barreto    DATE OF PROCEDURE:  2022    NAME OF PROCEDURE:  Cardioversion from atrial flutter to sinus rhythm. With the help of two RNs and a respiratory therapist, we gave propofol  to the patient. After he was asleep, we shocked him one time with 50  joules and he converted to sinus rhythm. He woke without difficulty and  there were no complications. IMPRESSION:  Successful cardioversion from atrial flutter to normal  sinus rhythm with 50 joules.         Mariaelena Moore    D: 2022 17:40:16       T: 2022 17:42:34     [email or web address]  Job#: [id number]     Doc#: [id number]    CC:  Truman Fox

## 2022-08-18 ENCOUNTER — HOSPITAL ENCOUNTER (OUTPATIENT)
Age: 67
Discharge: HOME | End: 2022-08-18
Payer: OTHER GOVERNMENT

## 2022-08-18 ENCOUNTER — OFFICE VISIT (OUTPATIENT)
Dept: CARDIOLOGY CLINIC | Age: 67
End: 2022-08-18
Payer: OTHER GOVERNMENT

## 2022-08-18 VITALS
BODY MASS INDEX: 32.34 KG/M2 | SYSTOLIC BLOOD PRESSURE: 110 MMHG | WEIGHT: 219 LBS | HEART RATE: 86 BPM | DIASTOLIC BLOOD PRESSURE: 60 MMHG | OXYGEN SATURATION: 95 %

## 2022-08-18 DIAGNOSIS — I48.92 ATRIAL FLUTTER, UNSPECIFIED TYPE (HCC): Primary | ICD-10-CM

## 2022-08-18 DIAGNOSIS — I48.92 ATRIAL FLUTTER, UNSPECIFIED TYPE (HCC): ICD-10-CM

## 2022-08-18 DIAGNOSIS — E78.5 HYPERLIPIDEMIA, UNSPECIFIED HYPERLIPIDEMIA TYPE: ICD-10-CM

## 2022-08-18 PROCEDURE — 1123F ACP DISCUSS/DSCN MKR DOCD: CPT | Performed by: INTERNAL MEDICINE

## 2022-08-18 PROCEDURE — 99214 OFFICE O/P EST MOD 30 MIN: CPT | Performed by: INTERNAL MEDICINE

## 2022-08-18 PROCEDURE — 93005 ELECTROCARDIOGRAM TRACING: CPT

## 2022-08-18 NOTE — PROGRESS NOTES
Ov Dr. Shasta Phan for 1 month f/u   S/p cardioversion   No palpations  No new sob   No chest pain or heaviness  No dizziness or lightheadedness  No edema   No new sx       No changes    Call back with medications you are taking    Follow up in 6 months       Pt called back back with medications he is taking   Med list updated according   to pt verbalizations via phone

## 2022-08-18 NOTE — LETTER
Chanel Richard M.D. [address] [place] [address] Khushbu Hebert [address]  [phone]        2022        Marcus Haas MD  [address] [place] [address]    RE:   Jeremy Charli  :  1955    Dear Dr. Payam Juares:    [address] [facility] [address]:  1. Atrial flutter. 2.  Status post two cardioversions, with the last cardioversion being on 2022, with him still in sinus rhythm. 3.  Shortness of breath, which has improved. HISTORY OF PRESENT ILLNESS:  I met with Mckenna Hurtado in our office on 2022. I know you received my full H and P from 2022. He developed marked shortness of breath with exertion starting in approximately 2021. He also had mild LV dysfunction, EF of 45% to 50%. He was in atrial flutter and was anticoagulated with Eliquis. We did a cardioversion on 2022. An EKG in preparation for 2022 appointment was abnormal with him back in atrial flutter. On , when he was back in atrial flutter, his shortness of breath was present but it seemed to be improved. He had no unusual pedal edema. I started Cardizem  mg daily and stopped metoprolol and did a cardioversion on 2022. He did convert to sinus rhythm. I brought him back today for reevaluation. He states that his shortness of breath is improved. He denies any chest pain or chest discomfort. No dizziness or lightheadedness. He feels the best he has felt since December of last year. He has had no PND, orthopnea or pedal edema. As you know, I had done a stress test on 2022, that was equivocal with a small amount of perfusion defect inferolaterally with mild transient ischemic dilatation. My plan was to do a cardiac catheterization if he was still markedly short of breath or was having chest pain. Again, he states that he is doing better and feels almost back to his baseline.     MEDICATIONS:  His medications are Eliquis 5 mg b.i.d., vitamin D 2000 units daily, Cardizem  mg daily, Lasix 20 mg daily, lisinopril 5 mg daily, Aldactone 25 mg daily. PHYSICAL EXAMINATION:  VITAL SIGNS:  His blood pressure was 110/60 with a heart rate of 86 and regular. Respiratory rate 18. O2 sat 95%. Weight 219 pounds. GENERAL:  He is a pleasant 60-year-old gentleman. Denied pain. He was oriented to person, place and time. Answered questions appropriately. SKIN:  No unusual skin changes. HEENT:  The pupils are equally round and intact. Mucous membranes were dry. NECK:  No JVD. Good carotid pulses. No carotid bruits. No lymphadenopathy or thyromegaly. CARDIOVASCULAR EXAM:  S1 and S2 were normal.  No S3 or S4. Soft systolic blowing type murmur. No diastolic murmur. PMI was normal.  No lift, thrust, or pericardial friction rub. LUNGS:  Clear to auscultation and percussion. ABDOMEN:  Soft and nontender. Good bowel sounds. EXTREMITIES:  Good femoral pulses. Good pedal pulses. No pedal edema. Skin was warm and dry. No calf tenderness. Nail beds pink. Good cap refill. EKG showed sinus rhythm, nonspecific ST changes. IMPRESSION:  1. Cardioversion on 05/25/2022, to sinus rhythm with him reverting back to atrial flutter, with the second cardioversion on 07/13/2022, with him on Cardizem  mg daily, with him maintaining sinus rhythm. 2.  Shortness of breath, which seems to be improved, at about back to baseline. 3.  Mildly abnormal Lexiscan stress test with inferior apical ischemia with transient ischemic dilatation. PLAN:  1. No change in medications. 2.  He will call us with his medication bottles to confirm that his medications are as I noted. 3.  I will see him in 6 months. DISCUSSION:  Mr. Trudy Patel overall seems to be doing well. He has maintained sinus rhythm. He thinks that his breathing is about back to baseline. Because of this, I will not proceed with a cardiac catheterization.   If he remains short of breath, then I would proceed with a cardiac catheterization. If he develops chest pain that is worsening or if he develops more shortness of breath, then I would directly proceed with a cardiac catheterization to define his anatomy. Thank you very much for allowing me the privilege of seeing Mr. Dimitris Garcia. If you have any questions on my thoughts, please do not hesitate to contact me.     Sincerely,        Sana Menjivar    D: 08/18/2022 13:34:07     T: 08/18/2022 13:38:20     [email or web address]  Job#: [id number]   Doc#: [id number]

## 2022-08-21 LAB
EKG ATRIAL RATE: 78 BPM
EKG P AXIS: 71 DEGREES
EKG P-R INTERVAL: 152 MS
EKG Q-T INTERVAL: 358 MS
EKG QRS DURATION: 84 MS
EKG QTC CALCULATION (BAZETT): 408 MS
EKG R AXIS: 91 DEGREES
EKG T AXIS: 73 DEGREES
EKG VENTRICULAR RATE: 78 BPM

## 2022-08-21 PROCEDURE — 93010 ELECTROCARDIOGRAM REPORT: CPT | Performed by: INTERNAL MEDICINE

## 2022-08-22 NOTE — PROGRESS NOTES
Beck Murphy M.D. [address] [place] [address] Khushbu Hebert   [phone]        2022        Luz Harkins MD  [address] [place] [address]    RE:   Cielo Ramirez  :  1955    Dear Dr. Keon Head:    [address] [facility] [address]:  1. Atrial flutter. 2.  Status post two cardioversions, with the last cardioversion being on 2022, with him still in sinus rhythm. 3.  Shortness of breath, which has improved. HISTORY OF PRESENT ILLNESS:  I met with Jacques Varma in our office on 2022. I know you received my full H and P from 2022. He developed marked shortness of breath with exertion starting in approximately 2021. He also had mild LV dysfunction, EF of 45% to 50%. He was in atrial flutter and was anticoagulated with Eliquis. We did a cardioversion on 2022. An EKG in preparation for 2022 appointment was abnormal with him back in atrial flutter. On , when he was back in atrial flutter, his shortness of breath was present but it seemed to be improved. He had no unusual pedal edema. I started Cardizem  mg daily and stopped metoprolol and did a cardioversion on 2022. He did convert to sinus rhythm. I brought him back today for reevaluation. He states that his shortness of breath is improved. He denies any chest pain or chest discomfort. No dizziness or lightheadedness. He feels the best he has felt since December of last year. He has had no PND, orthopnea or pedal edema. As you know, I had done a stress test on 2022, that was equivocal with a small amount of perfusion defect inferolaterally with mild transient ischemic dilatation. My plan was to do a cardiac catheterization if he was still markedly short of breath or was having chest pain. Again, he states that he is doing better and feels almost back to his baseline.     MEDICATIONS:  His medications are Eliquis 5 mg b.i.d., vitamin D 2000 units daily, Cardizem  mg daily, Lasix 20 mg daily, lisinopril 5 mg daily, Aldactone 25 mg daily. PHYSICAL EXAMINATION:  VITAL SIGNS:  His blood pressure was 110/60 with a heart rate of 86 and regular. Respiratory rate 18. O2 sat 95%. Weight 219 pounds. GENERAL:  He is a pleasant 71-year-old gentleman. Denied pain. He was oriented to person, place and time. Answered questions appropriately. SKIN:  No unusual skin changes. HEENT:  The pupils are equally round and intact. Mucous membranes were dry. NECK:  No JVD. Good carotid pulses. No carotid bruits. No lymphadenopathy or thyromegaly. CARDIOVASCULAR EXAM:  S1 and S2 were normal.  No S3 or S4. Soft systolic blowing type murmur. No diastolic murmur. PMI was normal.  No lift, thrust, or pericardial friction rub. LUNGS:  Clear to auscultation and percussion. ABDOMEN:  Soft and nontender. Good bowel sounds. EXTREMITIES:  Good femoral pulses. Good pedal pulses. No pedal edema. Skin was warm and dry. No calf tenderness. Nail beds pink. Good cap refill. EKG showed sinus rhythm, nonspecific ST changes. IMPRESSION:  1. Cardioversion on 05/25/2022, to sinus rhythm with him reverting back to atrial flutter, with the second cardioversion on 07/13/2022, with him on Cardizem  mg daily, with him maintaining sinus rhythm. 2.  Shortness of breath, which seems to be improved, at about back to baseline. 3.  Mildly abnormal Lexiscan stress test with inferior apical ischemia with transient ischemic dilatation. PLAN:  1. No change in medications. 2.  He will call us with his medication bottles to confirm that his medications are as I noted. 3.  I will see him in 6 months. DISCUSSION:  Mr. Harriet Meyer overall seems to be doing well. He has maintained sinus rhythm. He thinks that his breathing is about back to baseline. Because of this, I will not proceed with a cardiac catheterization.   If he remains short of breath, then I would proceed with a cardiac catheterization. If he develops chest pain that is worsening or if he develops more shortness of breath, then I would directly proceed with a cardiac catheterization to define his anatomy. Thank you very much for allowing me the privilege of seeing Mr. Valencia Jimenez. If you have any questions on my thoughts, please do not hesitate to contact me.     Sincerely,        Liset Coats    D: 08/18/2022 13:34:07     T: 08/18/2022 13:38:20     [email or web address]  Job#: [id number]   Doc#: [id number]

## 2022-09-19 ENCOUNTER — TELEPHONE (OUTPATIENT)
Dept: CARDIOLOGY CLINIC | Age: 67
End: 2022-09-19

## 2022-09-19 DIAGNOSIS — R42 DIZZINESS: Primary | ICD-10-CM

## 2022-09-19 NOTE — TELEPHONE ENCOUNTER
Jordan Vick called to state that he has been having dizzy spells and lightheadedness since he was here last in August.  He stated that they have been happening 2 or 3 times a weeks and only last a minutes or so. He stated that he does have double vision with them. Sometimes they happen right after he takes his medication. He stated that they happen at different times of the day. He stated that his BP has been good and  his blood sugar has been 125 and 131. He is also asking if it is okay to take Power Beets. He stated that he bought some at [email or web address]. To help with circulation and energy.     Please call Jordan Vick

## 2022-09-26 ENCOUNTER — HOSPITAL ENCOUNTER (OUTPATIENT)
Dept: NON INVASIVE DIAGNOSTICS | Age: 67
Discharge: HOME | End: 2022-09-26
Payer: OTHER GOVERNMENT

## 2022-09-26 PROCEDURE — 93270 REMOTE 30 DAY ECG REV/REPORT: CPT

## 2022-10-30 NOTE — PROCEDURES
[place] [address]                                 EVENT MONITOR    PATIENT NAME: Cheyenne Boswell                    :        1955  MED REC NO:   [id number]                              ROOM:  ACCOUNT NO:   [de-identified]                           ADMIT DATE: 2022  PROVIDER:     Aliyah Saunders MD    TEST TYPE:  EVENT RECORDER    INDICATION: PALPITATIONS. He wore an event recorder from 2022 to 10/25/2022. We received 20  transmissions. His transmissions showed sinus rhythm with PACs. His  minimal heart rate was 63. Maximum heart rate 135. Overall  unremarkable 30-day event recorder with no arrhythmias detected.         Latrell Mcdonnell MD    D: 10/29/2022 21:09:52       T: 10/30/2022 6:05:34     [email or web address]  Job#: [id number]     Doc#: [id number]    CC:

## 2023-02-15 ENCOUNTER — HOSPITAL ENCOUNTER (OUTPATIENT)
Age: 68
Discharge: HOME | End: 2023-02-15
Payer: OTHER GOVERNMENT

## 2023-02-15 DIAGNOSIS — I48.92 ATRIAL FLUTTER, UNSPECIFIED TYPE (HCC): ICD-10-CM

## 2023-02-15 DIAGNOSIS — E78.5 HYPERLIPIDEMIA, UNSPECIFIED HYPERLIPIDEMIA TYPE: ICD-10-CM

## 2023-02-15 LAB
ABSOLUTE EOS #: 0.1 K/UL (ref 0–0.4)
ABSOLUTE LYMPH #: 2.4 K/UL (ref 1–4.8)
ABSOLUTE MONO #: 0.6 K/UL (ref 0–1)
ALBUMIN SERPL-MCNC: 3.9 G/DL (ref 3.5–5.2)
ALP SERPL-CCNC: 61 U/L (ref 40–129)
ALT SERPL-CCNC: 18 U/L (ref 5–41)
ANION GAP SERPL CALCULATED.3IONS-SCNC: 10 MMOL/L (ref 9–17)
AST SERPL-CCNC: 15 U/L
BASOPHILS # BLD: 1 % (ref 0–2)
BASOPHILS ABSOLUTE: 0 K/UL (ref 0–0.2)
BILIRUB SERPL-MCNC: 0.5 MG/DL (ref 0.3–1.2)
BUN SERPL-MCNC: 12 MG/DL (ref 8–23)
BUN/CREAT BLD: 11 (ref 9–20)
CALCIUM SERPL-MCNC: 8.9 MG/DL (ref 8.6–10.4)
CHLORIDE SERPL-SCNC: 100 MMOL/L (ref 98–107)
CHOLEST SERPL-MCNC: 182 MG/DL
CHOLESTEROL/HDL RATIO: 4.1
CO2 SERPL-SCNC: 25 MMOL/L (ref 20–31)
CREAT SERPL-MCNC: 1.07 MG/DL (ref 0.7–1.2)
EKG ATRIAL RATE: 264 BPM
EKG P AXIS: -79 DEGREES
EKG Q-T INTERVAL: 390 MS
EKG QRS DURATION: 100 MS
EKG QTC CALCULATION (BAZETT): 447 MS
EKG R AXIS: 86 DEGREES
EKG T AXIS: 81 DEGREES
EKG VENTRICULAR RATE: 79 BPM
EOSINOPHILS RELATIVE PERCENT: 1 % (ref 0–5)
GFR SERPL CREATININE-BSD FRML MDRD: >60 ML/MIN/1.73M2
GLUCOSE SERPL-MCNC: 124 MG/DL (ref 70–99)
HCT VFR BLD AUTO: 55.4 % (ref 41–53)
HDLC SERPL-MCNC: 44 MG/DL
HGB BLD-MCNC: 18.6 G/DL (ref 13.5–17.5)
LDLC SERPL CALC-MCNC: 110 MG/DL (ref 0–130)
LYMPHOCYTES # BLD: 27 % (ref 13–44)
MAGNESIUM SERPL-MCNC: 2.3 MG/DL (ref 1.6–2.6)
MCH RBC QN AUTO: 30.8 PG (ref 26–34)
MCHC RBC AUTO-ENTMCNC: 33.5 G/DL (ref 31–37)
MCV RBC AUTO: 92 FL (ref 80–100)
MONOCYTES # BLD: 7 % (ref 5–9)
PATIENT FASTING?: YES
PDW BLD-RTO: 13.1 % (ref 12.1–15.2)
PLATELET # BLD AUTO: 317 K/UL (ref 140–450)
POTASSIUM SERPL-SCNC: 4.8 MMOL/L (ref 3.7–5.3)
PROT SERPL-MCNC: 6.5 G/DL (ref 6.4–8.3)
RBC # BLD: 6.03 M/UL (ref 4.5–5.9)
SEG NEUTROPHILS: 64 % (ref 39–75)
SEGMENTED NEUTROPHILS ABSOLUTE COUNT: 5.9 K/UL (ref 2.1–6.5)
SODIUM SERPL-SCNC: 135 MMOL/L (ref 135–144)
TRIGL SERPL-MCNC: 138 MG/DL
TSH SERPL-ACNC: 1.85 UIU/ML (ref 0.3–5)
WBC # BLD AUTO: 9 K/UL (ref 3.5–11)

## 2023-02-15 PROCEDURE — 85025 COMPLETE CBC W/AUTO DIFF WBC: CPT

## 2023-02-15 PROCEDURE — 80061 LIPID PANEL: CPT

## 2023-02-15 PROCEDURE — 83735 ASSAY OF MAGNESIUM: CPT

## 2023-02-15 PROCEDURE — 84443 ASSAY THYROID STIM HORMONE: CPT

## 2023-02-15 PROCEDURE — 93010 ELECTROCARDIOGRAM REPORT: CPT | Performed by: INTERNAL MEDICINE

## 2023-02-15 PROCEDURE — 80053 COMPREHEN METABOLIC PANEL: CPT

## 2023-02-15 PROCEDURE — 36415 COLL VENOUS BLD VENIPUNCTURE: CPT

## 2023-02-15 PROCEDURE — 93005 ELECTROCARDIOGRAM TRACING: CPT

## 2023-02-21 ENCOUNTER — OFFICE VISIT (OUTPATIENT)
Dept: CARDIOLOGY CLINIC | Age: 68
End: 2023-02-21
Payer: OTHER GOVERNMENT

## 2023-02-21 ENCOUNTER — HOSPITAL ENCOUNTER (OUTPATIENT)
Age: 68
Discharge: HOME | End: 2023-02-21

## 2023-02-21 VITALS
DIASTOLIC BLOOD PRESSURE: 78 MMHG | SYSTOLIC BLOOD PRESSURE: 130 MMHG | HEART RATE: 102 BPM | WEIGHT: 226 LBS | OXYGEN SATURATION: 95 % | BODY MASS INDEX: 33.37 KG/M2

## 2023-02-21 DIAGNOSIS — E78.5 HYPERLIPIDEMIA, UNSPECIFIED HYPERLIPIDEMIA TYPE: ICD-10-CM

## 2023-02-21 DIAGNOSIS — R06.02 SOB (SHORTNESS OF BREATH): ICD-10-CM

## 2023-02-21 DIAGNOSIS — R42 DIZZINESS: ICD-10-CM

## 2023-02-21 DIAGNOSIS — I48.92 ATRIAL FLUTTER, UNSPECIFIED TYPE (HCC): ICD-10-CM

## 2023-02-21 DIAGNOSIS — R42 DIZZINESS: Primary | ICD-10-CM

## 2023-02-21 LAB
ABSOLUTE EOS #: 0 K/UL (ref 0–0.4)
ABSOLUTE LYMPH #: 1.8 K/UL (ref 1–4.8)
ABSOLUTE MONO #: 0.6 K/UL (ref 0–1)
ABSOLUTE RETIC #: 0.1 M/UL (ref 0.02–0.12)
ANION GAP SERPL CALCULATED.3IONS-SCNC: 9 MMOL/L (ref 9–17)
BASOPHILS # BLD: 0 % (ref 0–2)
BASOPHILS ABSOLUTE: 0 K/UL (ref 0–0.2)
BUN SERPL-MCNC: 13 MG/DL (ref 8–23)
BUN/CREAT BLD: 13 (ref 9–20)
CALCIUM SERPL-MCNC: 9.1 MG/DL (ref 8.6–10.4)
CHLORIDE SERPL-SCNC: 98 MMOL/L (ref 98–107)
CO2 SERPL-SCNC: 27 MMOL/L (ref 20–31)
CREAT SERPL-MCNC: 1.03 MG/DL (ref 0.7–1.2)
CRP SERPL HS-MCNC: <3 MG/L (ref 0–5)
EOSINOPHILS RELATIVE PERCENT: 1 % (ref 0–5)
ERYTHROCYTE [SEDIMENTATION RATE] IN BLOOD BY WESTERGREN METHOD: 7 MM/HR (ref 0–20)
GFR SERPL CREATININE-BSD FRML MDRD: >60 ML/MIN/1.73M2
GLUCOSE SERPL-MCNC: 128 MG/DL (ref 70–99)
HCT VFR BLD AUTO: 54.3 % (ref 41–53)
HGB BLD-MCNC: 18.5 G/DL (ref 13.5–17.5)
LYMPHOCYTES # BLD: 24 % (ref 13–44)
MCH RBC QN AUTO: 31 PG (ref 26–34)
MCHC RBC AUTO-ENTMCNC: 34.1 G/DL (ref 31–37)
MCV RBC AUTO: 91.1 FL (ref 80–100)
MONOCYTES # BLD: 7 % (ref 5–9)
PDW BLD-RTO: 13 % (ref 12.1–15.2)
PLATELET # BLD AUTO: 288 K/UL (ref 140–450)
POTASSIUM SERPL-SCNC: 4.7 MMOL/L (ref 3.7–5.3)
RBC # BLD: 5.97 M/UL (ref 4.5–5.9)
RETICS/RBC NFR AUTO: 1.6 % (ref 0.5–2)
SEG NEUTROPHILS: 68 % (ref 39–75)
SEGMENTED NEUTROPHILS ABSOLUTE COUNT: 5.2 K/UL (ref 2.1–6.5)
SODIUM SERPL-SCNC: 134 MMOL/L (ref 135–144)
WBC # BLD AUTO: 7.7 K/UL (ref 3.5–11)

## 2023-02-21 PROCEDURE — 86235 NUCLEAR ANTIGEN ANTIBODY: CPT

## 2023-02-21 PROCEDURE — 82668 ASSAY OF ERYTHROPOIETIN: CPT

## 2023-02-21 PROCEDURE — 99214 OFFICE O/P EST MOD 30 MIN: CPT | Performed by: INTERNAL MEDICINE

## 2023-02-21 PROCEDURE — 86140 C-REACTIVE PROTEIN: CPT

## 2023-02-21 PROCEDURE — 36415 COLL VENOUS BLD VENIPUNCTURE: CPT

## 2023-02-21 PROCEDURE — 81270 JAK2 GENE: CPT

## 2023-02-21 PROCEDURE — 82607 VITAMIN B-12: CPT

## 2023-02-21 PROCEDURE — 85045 AUTOMATED RETICULOCYTE COUNT: CPT

## 2023-02-21 PROCEDURE — 83550 IRON BINDING TEST: CPT

## 2023-02-21 PROCEDURE — 1123F ACP DISCUSS/DSCN MKR DOCD: CPT | Performed by: INTERNAL MEDICINE

## 2023-02-21 PROCEDURE — 80048 BASIC METABOLIC PNL TOTAL CA: CPT

## 2023-02-21 PROCEDURE — 85652 RBC SED RATE AUTOMATED: CPT

## 2023-02-21 PROCEDURE — 82728 ASSAY OF FERRITIN: CPT

## 2023-02-21 PROCEDURE — 82746 ASSAY OF FOLIC ACID SERUM: CPT

## 2023-02-21 PROCEDURE — 83540 ASSAY OF IRON: CPT

## 2023-02-21 PROCEDURE — 85025 COMPLETE CBC W/AUTO DIFF WBC: CPT

## 2023-02-21 NOTE — LETTER
Rashmi Felipe M.D. [address] [place], [place] [address]   [phone]          2023          Melanie Galvan MD  [address] [place] [address]      RE:   Juliet Zamora  :  1955      Dear Dr. Jelena Crenshaw:    [address]:  1. Paroxysmal atrial fibrillation, currently in atrial fibrillation. 2.  Shortness of breath with chest pain. 3.  Possible old CVA, with him seeing Dr. Lucero Garcia at [facility] for diplopia on 2023. HISTORY OF PRESENT ILLNESS:  Mr. Juliet Zamora is a pleasant 80-year-old gentleman who has a history of paroxysmal atrial fib/flutter. He also has a history of mild LV dysfunction, EF in the 45% to 50% range. I met him on 2022. At that time, he had never had a stress test or echocardiogram.    In 2021, he noticed dyspnea with exertion. He went hunting and he had to stop for shortness of breath, which worsened ever since 2021. In 2022, he started walking and eating better. However, he continued to be short of breath with exertion. He had some chest discomfort with both typical and atypical characteristics. He does have a history of multiple small lower lung nodules, which have not changed on CT scans. He had an echocardiogram at the [facility] and his echo showed an EF of 45% to 50% with mild global hypokinesis. He was noted that he was in atrial flutter and he was started on Eliquis 5 mg b.i.d. and placed on Lasix 20 mg daily. His edema improved. He had no syncope or near syncope and he could not tell that he was in atrial flutter. I did a Lexiscan Cardiolite stress test and started Aldactone for his pedal edema. We also started him on lisinopril and metoprolol for his LV dysfunction. He did convert with the cardioversion on 2022, to sinus rhythm. Delfino Mering He felt much better after his cardioversion. He had no pedal edema.   His Lexiscan showed inferior wall ischemia with mild transient ischemic dilatation. However, he reverted back to atrial flutter again and therefore we did another cardioversion on 07/13/2022, to sinus rhythm. I saw him on 08/18/2022, and at that time, he was in sinus rhythm and doing well. He felt he was almost back to baseline. Because he was back to baseline, I did not proceed with a catheterization. I brought him back today for reevaluation. He did complain of intermittent diplopia. He had a CT scan (I am not sure where), which said that there was a possible CVA. He is seeing Dr. Nava Dexter at [facility] in Neurology for his diplopia and possible CVA. He did not bring his medications. He does state that his shortness of breath is worse. He had some chest pain and his energy level is worse than it had been previously. He did stop smoking or cut back on his smoking. He does wear a CPAP mask at night. He did have PFTs through the [address] although it sounds like it has not been for several years. In preparation for today's visit, we did labs that showed his hemoglobin was 18.6 with a hematocrit of 55.4. This has markedly increased from his previous labs. Also, in preparation for this visit, we did see that he was back in atrial fib/flutter with a controlled ventricular response. He has never had a myocardial infarction or cardiac catheterization. CARDIAC RISK FACTORS:  Smoking:  Positive. Hypertension:  Positive. Hyperlipidemia:  Positive. Peripheral Vascular Disease:  Negative. Other Family Members:  Positive. Diabetes:  Positive. MEDICATIONS AT HOME:  (He will call us with his bottles but this is what we have listed) Eliquis 5 mg b.i.d., vitamin D 2000 units daily, Cardizem  mg daily, Lasix 20 mg daily, lisinopril 5 mg daily, spironolactone 25 mg daily. PAST MEDICAL AND SURGICAL HISTORY:  1. He has a history of left Achilles tendon tear on 02/08/2018. 2.  Adenomatous polyp of the colon on 08/03/2016.   3. Alcohol abuse on 02/03/2016.  4.  Shoulder pain on 02/19/2014.  5.  Sleep apnea, on a CPAP mask, which he has been on for many years. 6.  Small lung nodules in the lung, which appear to be benign. 7.  History of possible mass in his left lobe of the liver by CT scan but negative by MRI and most consistent with a cyst.    FAMILY HISTORY:  Mother had a MI and a pacemaker. One sister with no heart disease. SOCIAL HISTORY:  He is 76years old, . One child by the name of Daljit Benitez who is 44, drug abuser. He is retired. Smokes a pack of cigarettes per day. Drinks 18 beers weekly. He is a retired  and was self-employed. Does not exercise. Enjoys hunting. REVIEW OF SYSTEMS:  Cardiac as above. Other systems reviewed including constitutional, eyes, ears, nose and throat, cardiovascular, respiratory, GI, , musculoskeletal, integumentary, neurologic, endocrine, hematologic and allergic/immunologic are negative except for what is described above. No weight loss or weight gain. No change in bowel habits. No blood in stool. No fevers, sweats or chills. PHYSICAL EXAMINATION:  VITAL SIGNS:  His blood pressure was 120/78 with a heart rate of 102 and irregular. Respiratory rate 18. O2 sat 95%. Weight 226 pounds. GENERAL:  He is a pleasant 20-year-old gentleman. Denied pain. He was oriented to person, place and time. Answered questions appropriately. SKIN:  No unusual skin changes. HEENT:  The pupils are equally round and intact. Mucous membranes were dry. NECK:  No JVD. Good carotid pulses. No carotid bruits. No lymphadenopathy or thyromegaly. CARDIOVASCULAR EXAM:  S1 and S2 were normal.  No S3 or S4. Soft systolic blowing type murmur. No diastolic murmur. PMI was normal.  No lift, thrust, or pericardial friction rub. LUNGS:  Clear to auscultation and percussion. ABDOMEN:  Soft and nontender. Good bowel sounds. EXTREMITIES:  Good femoral pulses.   Good pedal pulses. No pedal edema. Skin was warm and dry. No calf tenderness. Nail beds pink. Good cap refill. PULSES:  Bilateral symmetrical radial, brachial and carotid pulses. No carotid bruits. Good femoral and pedal pulses. NEUROLOGIC EXAM:  Within normal limits. PSYCHIATRIC EXAM:  Within normal limits. LABORATORY DATA:  His sodium was 135, potassium 4.8, BUN 12, creatinine 1.07, magnesium 2.3. Cholesterol 182, HDL 44, , triglycerides 138. ALT was 18, AST was 15. His TSH was 1.85. White count 9.0, hemoglobin 18.6 with a hematocrit of 55.4, platelet count was 004,762. His EKG showed atrial flutter with a controlled ventricular response. IMPRESSION:  1. Worsening shortness of breath along with some chest discomfort. 2.  History of paroxysmal atrial fibrillation, with him back in atrial fibrillation again, which may be the cause of his shortness of breath. 3.  Continued smoking abuse. 4.  Hematocrit elevated at 55 with a hemoglobin of 18, possibly secondary to his smoking abuse, although this is a new finding and maybe secondary to JAK2 mutation. 5.  History of mild LV dysfunction, EF of 45% to 50%. 6.  Anticoagulated with Eliquis. 9.  Possible old CVA with intermittent diplopia, with him seeing neurology at [facility]. 8.  Diabetes. 9.  Hypertension. 10.  Hyperlipidemia. PLAN:  1. We will do erythropoietin level along with reticulocyte count, iron studies, JAK2 mutation, sed rate, GEORGIE and CRP, because of his elevated hematocrit at 55.  2.  He will see Dr. Libby More for his elevated hematocrit. 3.  We will proceed with a cardiac catheterization in view of his shortness of breath and chest pain and abnormal Lexiscan stress test.  4.  We will not cardiovert at this time, but we will proceed with a catheterization as stated previously. 5.  Encouraged to stop smoking. DISCUSSION:  Mr. Kevin Stovall is more short of breath.   This may be secondary to his atrial fib/flutter; however, with his abnormal stress test and his chest pain with shortness of breath, I think it is prudent to proceed with a cardiac catheterization to define his anatomy.  If his cath is negative for any significant coronary artery disease, he would be a candidate for further antiarrhythmic therapy such as propafenone or flecainide, etc.    I will have him call with his medication bottles from home so that I can make sure that he is on the prescriptions that have been ordered.    I do note that his hematocrit is fairly elevated at 55.  I will do some basic labs and have him follow with Dr. Fox.  Again, I am not sure whether this is due to his smoking abuse, but we should be able to better analyze with a reticulocyte count and his erythropoietic level.    I discussed the above with Manjeet and he is willing to proceed with the catheterization.    Thank you very much for allowing me the privilege of seeing Mr. Manjeet Portillo.  Again, he will see Dr. Fox concerning his elevated hematocrit.  If you have any questions on my thoughts, please do not hesitate to contact me.    Sincerely,        MANUEL DOE MD    D: 02/21/2023 9:34:56     T: 02/21/2023 9:41:23     [id number]  Job#: [id number]   Doc#: [id number]

## 2023-02-21 NOTE — PROGRESS NOTES
Ov Dr. Sudhakar Mena for 6 month f/u   Did not bring list of meds states  \"Should be same as what you got\"   Had a ct abn -ct was done d/t double vision  Seeing neuro at [facility] possible stroke   No further appts  No chest pain   No palpitations   Sob is worse   Wears cpap   Not on O2  Has had PFT thru [place] \"not recently\"  No edema        Call back with medications you are taking     Follow up with Dr. Adilia Rascon regarding labs     Med changes will be pending cath results    Will proceed with cath on March 10th    Will get labs today

## 2023-02-21 NOTE — PATIENT INSTRUCTIONS
Call back with medications you are taking     Follow up with Dr. Nilesh Hinds regarding labs     Med changes will be pending cath results    Will proceed with cath on March 10th    Will get labs today

## 2023-02-22 LAB
ERYTHROPOIETIN: 6 MU/ML (ref 4–27)
FERRITIN SERPL-MCNC: 63 NG/ML (ref 30–400)
FOLATE SERPL-MCNC: 13.8 NG/ML
IRON SATURATION: 45 % (ref 20–55)
IRON SERPL-MCNC: 177 UG/DL (ref 59–158)
TIBC SERPL-MCNC: 393 UG/DL (ref 250–450)
UNSATURATED IRON BINDING CAPACITY: 216 UG/DL (ref 112–347)
VIT B12 SERPL-MCNC: 439 PG/ML (ref 232–1245)

## 2023-02-23 NOTE — PROGRESS NOTES
Clare Bray M.D. [address] [place] [address], [place] [address]  [phone]          2023          Alejandrina Irwin MD  [address] [place] [address]      RE:   Sunni Skinner  :  1955      Dear Dr. Bimal Ricardo:    [address]:  1. Paroxysmal atrial fibrillation, currently in atrial fibrillation. 2.  Shortness of breath with chest pain. 3.  Possible old CVA, with him seeing Dr. Maxi Dorado at [facility] for diplopia on 2023. HISTORY OF PRESENT ILLNESS:  Mr. Sunni Skinner is a pleasant 70-year-old gentleman who has a history of paroxysmal atrial fib/flutter. He also has a history of mild LV dysfunction, EF in the 45% to 50% range. I met him on 2022. At that time, he had never had a stress test or echocardiogram.    In 2021, he noticed dyspnea with exertion. He went hunting and he had to stop for shortness of breath, which worsened ever since 2021. In 2022, he started walking and eating better. However, he continued to be short of breath with exertion. He had some chest discomfort with both typical and atypical characteristics. He does have a history of multiple small lower lung nodules, which have not changed on CT scans. He had an echocardiogram at the [place] in [place] and his echo showed an EF of 45% to 50% with mild global hypokinesis. He was noted that he was in atrial flutter and he was started on Eliquis 5 mg b.i.d. and placed on Lasix 20 mg daily. His edema improved. He had no syncope or near syncope and he could not tell that he was in atrial flutter. I did a Lexiscan Cardiolite stress test and started Aldactone for his pedal edema. We also started him on lisinopril and metoprolol for his LV dysfunction. He did convert with the cardioversion on 2022, to sinus rhythm. Christiano Henleykes He felt much better after his cardioversion. He had no pedal edema.   His Lexiscan showed inferior wall ischemia with mild transient ischemic dilatation. However, he reverted back to atrial flutter again and therefore we did another cardioversion on 07/13/2022, to sinus rhythm. I saw him on 08/18/2022, and at that time, he was in sinus rhythm and doing well. He felt he was almost back to baseline. Because he was back to baseline, I did not proceed with a catheterization. I brought him back today for reevaluation. He did complain of intermittent diplopia. He had a CT scan (I am not sure where), which said that there was a possible CVA. He is seeing Dr. Urbano Beckman at [facility] in Neurology for his diplopia and possible CVA. He did not bring his medications. He does state that his shortness of breath is worse. He had some chest pain and his energy level is worse than it had been previously. He did stop smoking or cut back on his smoking. He does wear a CPAP mask at night. He did have PFTs through the [place] although it sounds like it has not been for several years. In preparation for today's visit, we did labs that showed his hemoglobin was 18.6 with a hematocrit of 55.4. This has markedly increased from his previous labs. Also, in preparation for this visit, we did see that he was back in atrial fib/flutter with a controlled ventricular response. He has never had a myocardial infarction or cardiac catheterization. CARDIAC RISK FACTORS:  Smoking:  Positive. Hypertension:  Positive. Hyperlipidemia:  Positive. Peripheral Vascular Disease:  Negative. Other Family Members:  Positive. Diabetes:  Positive. MEDICATIONS AT HOME:  (He will call us with his bottles but this is what we have listed) Eliquis 5 mg b.i.d., vitamin D 2000 units daily, Cardizem  mg daily, Lasix 20 mg daily, lisinopril 5 mg daily, spironolactone 25 mg daily. PAST MEDICAL AND SURGICAL HISTORY:  1. He has a history of left Achilles tendon tear on 02/08/2018. 2.  Adenomatous polyp of the colon on 08/03/2016.   3. Alcohol abuse on 02/03/2016.  4.  Shoulder pain on 02/19/2014.  5.  Sleep apnea, on a CPAP mask, which he has been on for many years. 6.  Small lung nodules in the lung, which appear to be benign. 7.  History of possible mass in his left lobe of the liver by CT scan but negative by MRI and most consistent with a cyst.    FAMILY HISTORY:  Mother had a MI and a pacemaker. One sister with no heart disease. SOCIAL HISTORY:  He is 76years old, . One child by the name of Antonio Ramirez who is 44, drug abuser. He is retired. Smokes a pack of cigarettes per day. Drinks 18 beers weekly. He is a retired  and was self-employed. Does not exercise. Enjoys hunting. REVIEW OF SYSTEMS:  Cardiac as above. Other systems reviewed including constitutional, eyes, ears, nose and throat, cardiovascular, respiratory, GI, , musculoskeletal, integumentary, neurologic, endocrine, hematologic and allergic/immunologic are negative except for what is described above. No weight loss or weight gain. No change in bowel habits. No blood in stool. No fevers, sweats or chills. PHYSICAL EXAMINATION:  VITAL SIGNS:  His blood pressure was 120/78 with a heart rate of 102 and irregular. Respiratory rate 18. O2 sat 95%. Weight 226 pounds. GENERAL:  He is a pleasant 59-year-old gentleman. Denied pain. He was oriented to person, place and time. Answered questions appropriately. SKIN:  No unusual skin changes. HEENT:  The pupils are equally round and intact. Mucous membranes were dry. NECK:  No JVD. Good carotid pulses. No carotid bruits. No lymphadenopathy or thyromegaly. CARDIOVASCULAR EXAM:  S1 and S2 were normal.  No S3 or S4. Soft systolic blowing type murmur. No diastolic murmur. PMI was normal.  No lift, thrust, or pericardial friction rub. LUNGS:  Clear to auscultation and percussion. ABDOMEN:  Soft and nontender. Good bowel sounds. EXTREMITIES:  Good femoral pulses.   Good pedal pulses. No pedal edema. Skin was warm and dry. No calf tenderness. Nail beds pink. Good cap refill. PULSES:  Bilateral symmetrical radial, brachial and carotid pulses. No carotid bruits. Good femoral and pedal pulses. NEUROLOGIC EXAM:  Within normal limits. PSYCHIATRIC EXAM:  Within normal limits. LABORATORY DATA:  His sodium was 135, potassium 4.8, BUN 12, creatinine 1.07, magnesium 2.3. Cholesterol 182, HDL 44, , triglycerides 138. ALT was 18, AST was 15. His TSH was 1.85. White count 9.0, hemoglobin 18.6 with a hematocrit of 55.4, platelet count was [phone]. His EKG showed atrial flutter with a controlled ventricular response. IMPRESSION:  1. Worsening shortness of breath along with some chest discomfort. 2.  History of paroxysmal atrial fibrillation, with him back in atrial fibrillation again, which may be the cause of his shortness of breath. 3.  Continued smoking abuse. 4.  Hematocrit elevated at 55 with a hemoglobin of 18, possibly secondary to his smoking abuse, although this is a new finding and maybe secondary to JAK2 mutation. 5.  History of mild LV dysfunction, EF of 45% to 50%. 6.  Anticoagulated with Eliquis. 9.  Possible old CVA with intermittent diplopia, with him seeing neurology at [facility]. 8.  Diabetes. 9.  Hypertension. 10.  Hyperlipidemia. PLAN:  1. We will do erythropoietin level along with reticulocyte count, iron studies, JAK2 mutation, sed rate, GEORGIE and CRP, because of his elevated hematocrit at 55.  2.  He will see Dr. Lashonda Hampton for his elevated hematocrit. 3.  We will proceed with a cardiac catheterization in view of his shortness of breath and chest pain and abnormal Lexiscan stress test.  4.  We will not cardiovert at this time, but we will proceed with a catheterization as stated previously. 5.  Encouraged to stop smoking. DISCUSSION:  Mr. Leonor Salazar is more short of breath.   This may be secondary to his atrial fib/flutter; however, with his abnormal stress test and his chest pain with shortness of breath, I think it is prudent to proceed with a cardiac catheterization to define his anatomy. If his cath is negative for any significant coronary artery disease, he would be a candidate for further antiarrhythmic therapy such as propafenone or flecainide, etc.    I will have him call with his medication bottles from home so that I can make sure that he is on the prescriptions that have been ordered. I do note that his hematocrit is fairly elevated at 55. I will do some basic labs and have him follow with Dr. Magda Hankins. Again, I am not sure whether this is due to his smoking abuse, but we should be able to better analyze with a reticulocyte count and his erythropoietic level. I discussed the above with Shivam Milton and he is willing to proceed with the catheterization. Thank you very much for allowing me the privilege of seeing Mr. Jesse Muñoz. Again, he will see Dr. Magda Hankins concerning his elevated hematocrit. If you have any questions on my thoughts, please do not hesitate to contact me.     Sincerely,        La Cantrell MD    D: 02/21/2023 9:34:56     T: 02/21/2023 9:41:23     [id number]  Job#: [id number]   Doc#: [id number]

## 2023-02-24 LAB
ANTI JO-1 IGG: <0.4 U/ML
ANTI-CENTROMERE: <0.4 U/ML
ENA SCL70 IGG SER IA-ACNC: <0.6 U/ML
ENA SM IGG SER-ACNC: <0.8 U/ML
ENA SS-A IGG SER QL: <0.3 U/ML
ENA SS-B IGG SER IA-ACNC: <0.3 U/ML
U1 SNRNP IGG SER IA-ACNC: 0.5 U/ML
U1 SNRNP IGG SER IA-ACNC: <0.3 U/ML

## 2023-03-15 ENCOUNTER — TELEPHONE (OUTPATIENT)
Dept: CARDIOLOGY CLINIC | Age: 68
End: 2023-03-15

## 2023-03-15 DIAGNOSIS — Z98.61 POST PTCA: Primary | ICD-10-CM

## 2023-03-28 ENCOUNTER — HOSPITAL ENCOUNTER (OUTPATIENT)
Dept: CARDIAC REHAB | Age: 68
Setting detail: THERAPIES SERIES
Discharge: HOME | End: 2023-03-28
Payer: OTHER GOVERNMENT

## 2023-03-28 NOTE — PROGRESS NOTES
warranted. Blood sugar monitoring for Hyper/Hypoglycemia symptoms.     Electronically signed by Erika Ferraro RN on 3/28/23 at 1:17 PM EDT  Cardiac Rehab Staff
reported. Muscular Skeletal-  [x] Joint discomfort Where? SOFIA SHOULDER AND LT ANKLE (REMOTE CEASAR'S TEAR) CHRONIC-INTERMITTENT \"ACHE\"  Pain Assessment-   Pain Level Tolerable <4+/10 on 10 point Likert scale  Location/ radiation/ Frequency/ Duration-  [x] Joint discomfort Where? SOFIA SHOULDER AND LT ANKLE (REMOTE CEASAR'S TEAR) CHRONIC-INTERMITTENT \"ACHE\"  Endocrine- T2DM  Gastrointestinal- No deficit noted or reported. Genitourinary- No deficit noted or reported.   Physical limitations- only as per above     Goals:     Overall Personal Program Goal:  \" BE ABLE TO BREATHE BETTER \"   [x] Initial Goal   [] Progressing to Goal  [] Not Meeting--Needs Reinforcement  [] Goal Met  [] Goal Not Met    To increase stamina, strength, and flexibility by exercising 31-50 total minutes by engaging in aerobic, resistance, and flexibility workout modalities with the goal of progressively achieving at least 0.5 to 1.0 metabolic equivalant improvement in the next 30 days as evidenced by daily session reports / [x] Initial Goal     [] Progressing to Goal / SUBMAX TM GXT PEAK WL OF 5.5 METS [] Not Meeting--Needs Reinforcement   [] Goal Met  [] Goal Not Met    To achieve and progress prescribed exercise frequency, intensity, time, and type in the next 30 days based upon initial evaluation and submaximal graded exercise results as evidenced by the attaining and maintaining the prescribed target heart rate range, a Curtis rating of perceived exertion between 11 and 16, duration of >30 - 50 minutes using multiple exercise modes for at least 3 - 5 days per week to accumulate a minimum total of 2.5 hours per week of moderate aerobic intensity exercise, as tolerated, evidenced by daily session reports and home workout log /         [x] Initial Goal   [] Progressing to Goal  [] Not Meeting--Needs Reinforcement   [] Goal Met  [] Goal Not Met       To gradually and progressively lose 2 - 4 lb of body weight in the next 30 days through moderating

## 2023-03-30 ENCOUNTER — HOSPITAL ENCOUNTER (OUTPATIENT)
Dept: CARDIAC REHAB | Age: 68
Setting detail: THERAPIES SERIES
Discharge: HOME | End: 2023-03-30
Payer: OTHER GOVERNMENT

## 2023-03-30 PROCEDURE — 93798 PHYS/QHP OP CAR RHAB W/ECG: CPT

## 2023-03-30 NOTE — PROGRESS NOTES
days using weights/ GREEN therabands AND WTS TO 8-24 # for 8-15 reps to progressive overload by increasing resistance once reps progressed to at least 15 reps on at least 2 occasions     Education:   [x] Equipment [place]  [x] Self pulse   [x] Proper use weights/therabands   [x] S/S to report  [x] Low Na Diet    [x] Warm up/ Cool down  [x] BP Medication    [x]RPE Scale   [x] Understand BP   [x] Ex Safety   [x] Exercise specialist class-Home Exercise       Target Goal:   -Individual Exercise Plan  -BP<130/80  -Aerobic active 30 + minutes 5-7 days per week    Nutrition    Stages of Change:   [] pre-contemplation  [x] Action   [] Contemplate   [] Maintainence   [x] Prep   [] Relapse    Lipids:   0 Result Notes    1  Topic          Component Ref Range & Units 2/15/23 0921 4/13/22 10/24/19 3/8/12   Cholesterol <200 mg/dL 182       Comment:     Cholesterol Guidelines:       <200  Desirable    200-240  Borderline       >240  Undesirable        HDL >40 mg/dL 44  33 Abnormal  R  45 R  46 R    Comment:     HDL Guidelines:     <40     Undesirable    40-59    Borderline     >59     Desirable        LDL Cholesterol 0 - 130 mg/dL 110       Comment:     LDL Guidelines:      <100    Desirable    100-129   Near to/above Desirable    130-159   Borderline       >159   Undesirable       Direct (measured) LDL and calculated LDL are not interchangeable tests. Chol/HDL Ratio <5 4.1   R   R  3.8 R    Comment:        Triglycerides <150 mg/dL 138  145 R  87 R  117 R    Comment:     Triglyceride Guidelines:      <150   Desirable    150-199  Borderline    200-499  High      >499   Very high    Based on AHA Guidelines for fasting triglyceride, October 2012.         Cholesterol, Total   167 R  184 R  176    LDL Calculated   132 R  116 R  107 R    VLDL       23 R    Cholesterol non HDL         [address] [facility]                 Specimen Collected: 02/15/23 09:21 EST Last Resulted: 02/15/23 17:34 EST           Lipid

## 2023-04-03 ENCOUNTER — HOSPITAL ENCOUNTER (OUTPATIENT)
Dept: CARDIAC REHAB | Age: 68
Setting detail: THERAPIES SERIES
Discharge: HOME | End: 2023-04-03
Payer: OTHER GOVERNMENT

## 2023-04-03 PROCEDURE — 93798 PHYS/QHP OP CAR RHAB W/ECG: CPT

## 2023-04-04 ENCOUNTER — HOSPITAL ENCOUNTER (OUTPATIENT)
Dept: CARDIAC REHAB | Age: 68
Setting detail: THERAPIES SERIES
Discharge: HOME | End: 2023-04-04
Payer: OTHER GOVERNMENT

## 2023-04-04 PROCEDURE — 93798 PHYS/QHP OP CAR RHAB W/ECG: CPT

## 2023-04-06 ENCOUNTER — HOSPITAL ENCOUNTER (OUTPATIENT)
Dept: CARDIAC REHAB | Age: 68
Setting detail: THERAPIES SERIES
Discharge: HOME | End: 2023-04-06
Payer: OTHER GOVERNMENT

## 2023-04-06 PROCEDURE — 93798 PHYS/QHP OP CAR RHAB W/ECG: CPT

## 2023-04-17 ENCOUNTER — HOSPITAL ENCOUNTER (OUTPATIENT)
Dept: CARDIAC REHAB | Age: 68
Setting detail: THERAPIES SERIES
Discharge: HOME | End: 2023-04-17
Payer: OTHER GOVERNMENT

## 2023-04-17 PROCEDURE — 93798 PHYS/QHP OP CAR RHAB W/ECG: CPT

## 2023-04-18 ENCOUNTER — HOSPITAL ENCOUNTER (OUTPATIENT)
Dept: CARDIAC REHAB | Age: 68
Setting detail: THERAPIES SERIES
Discharge: HOME | End: 2023-04-18
Payer: OTHER GOVERNMENT

## 2023-04-18 PROCEDURE — 93798 PHYS/QHP OP CAR RHAB W/ECG: CPT

## 2023-04-20 ENCOUNTER — HOSPITAL ENCOUNTER (OUTPATIENT)
Dept: CARDIAC REHAB | Age: 68
Setting detail: THERAPIES SERIES
Discharge: HOME | End: 2023-04-20
Payer: OTHER GOVERNMENT

## 2023-04-20 ENCOUNTER — TELEPHONE (OUTPATIENT)
Dept: CARDIOLOGY CLINIC | Age: 68
End: 2023-04-20

## 2023-04-20 PROCEDURE — 93798 PHYS/QHP OP CAR RHAB W/ECG: CPT

## 2023-04-20 RX ORDER — LOVASTATIN 40 MG/1
40 TABLET ORAL NIGHTLY
Qty: 90 TABLET | Refills: 3 | Status: SHIPPED | OUTPATIENT
Start: 2023-04-20

## 2023-04-20 NOTE — TELEPHONE ENCOUNTER
Alissa--nurse for Jessica Clay at the [facility] left a message to state that their pharmacy will not allow the fill of Atorvastatin 80 mg because of the interaction with Diltiazem. She stated that they will pay for Lovastatin if Dr. Nancy Liu would be okay with that one.     Please call  Vijaya Mccormick at

## 2023-04-24 ENCOUNTER — HOSPITAL ENCOUNTER (OUTPATIENT)
Dept: CARDIAC REHAB | Age: 68
Setting detail: THERAPIES SERIES
Discharge: HOME | End: 2023-04-24
Payer: OTHER GOVERNMENT

## 2023-04-24 PROCEDURE — 93798 PHYS/QHP OP CAR RHAB W/ECG: CPT

## 2023-04-24 RX ORDER — ROSUVASTATIN CALCIUM 40 MG/1
40 TABLET, COATED ORAL DAILY
Qty: 90 TABLET | Refills: 3 | Status: SHIPPED | OUTPATIENT
Start: 2023-04-24

## 2023-04-25 ENCOUNTER — APPOINTMENT (OUTPATIENT)
Dept: CARDIAC REHAB | Age: 68
End: 2023-04-25
Payer: OTHER GOVERNMENT

## 2023-04-27 ENCOUNTER — HOSPITAL ENCOUNTER (OUTPATIENT)
Dept: CARDIAC REHAB | Age: 68
Setting detail: THERAPIES SERIES
Discharge: HOME | End: 2023-04-27
Payer: OTHER GOVERNMENT

## 2023-04-27 PROCEDURE — 93798 PHYS/QHP OP CAR RHAB W/ECG: CPT

## 2023-04-27 NOTE — PROGRESS NOTES
the prescribed target heart rate range, a Curtis rating of perceived exertion between 11 and 16, duration of >30 - 50 minutes using multiple exercise modes for at least 3 - 5 days per week to accumulate a minimum total of 2.5 hours per week of moderate aerobic intensity exercise, as tolerated, evidenced by daily session reports and home workout log /         [] Initial Goal   [x] Progressing to Goal / PT COMPLIANT--SEE DAILY NOTES [] Not Meeting--Needs Reinforcement   [] Goal Met  [] Goal Not Met      To gradually and progressively lose 2 - 4 lb of body weight in the next 30 days through moderating nutrional intake and performing regular aerobic and strength training exercises as prescribed with improvement evidenced by daily session report comparison / [] Initial Goal   [] Progressing to Goal  [x] Not Meeting--Needs Reinforcement  [] Goal Met  [] Goal Not Met     To decrease waist circumference by 5% by program completion if waist measurement is > or = 40 inches (male) / > or = 35 inches (female) as evidence by the [employer] /     [x] Initial Goal  /  / TO REASSESS AT PROGRAM CONCLUSION   [] Progressing to Goal / INITIAL MEASURE OF 50.25 IN [] Not Meeting--Needs Reinforcement  [] Goal Met  [] Goal Not Met     To introduce and progress 8-10 different bilateral UE and LE progressive resistance exercises focused on major muscle groups using 1-3 sets each per lift, on 2-3 non-consecutive days implementing free and machine weights and GREY therabands, as appropriate, with a resistance of 40-60% 1-repetition maximum or 10 -15 repetitions to progressive overload and increasing resistance once repetitions have progressed to 15 reps and feel fairly light on 2 prior occasions in the next 30 days / [] Initial Goal   [] Progressing to Goal / ENCOURAGE AND INSTRUCT ABOUT THE IMPORTANCE TO INCLUDE RESISTANCE TRAINING [x] Not Meeting--Needs Reinforcement  [] Goal Met  [] Goal Not Met     To achieve and maintain an

## 2023-05-01 ENCOUNTER — HOSPITAL ENCOUNTER (OUTPATIENT)
Dept: CARDIAC REHAB | Age: 68
Setting detail: THERAPIES SERIES
Discharge: HOME | End: 2023-05-01
Payer: OTHER GOVERNMENT

## 2023-05-01 PROCEDURE — 93798 PHYS/QHP OP CAR RHAB W/ECG: CPT

## 2023-05-02 ENCOUNTER — HOSPITAL ENCOUNTER (OUTPATIENT)
Dept: CARDIAC REHAB | Age: 68
Setting detail: THERAPIES SERIES
Discharge: HOME | End: 2023-05-02
Payer: OTHER GOVERNMENT

## 2023-05-02 PROCEDURE — 93798 PHYS/QHP OP CAR RHAB W/ECG: CPT

## 2023-05-04 ENCOUNTER — HOSPITAL ENCOUNTER (OUTPATIENT)
Dept: CARDIAC REHAB | Age: 68
Setting detail: THERAPIES SERIES
Discharge: HOME | End: 2023-05-04
Payer: OTHER GOVERNMENT

## 2023-05-04 PROCEDURE — 93798 PHYS/QHP OP CAR RHAB W/ECG: CPT

## 2023-05-08 ENCOUNTER — HOSPITAL ENCOUNTER (OUTPATIENT)
Dept: CARDIAC REHAB | Age: 68
Setting detail: THERAPIES SERIES
Discharge: HOME | End: 2023-05-08
Payer: OTHER GOVERNMENT

## 2023-05-08 PROCEDURE — 93798 PHYS/QHP OP CAR RHAB W/ECG: CPT

## 2023-05-09 ENCOUNTER — HOSPITAL ENCOUNTER (OUTPATIENT)
Dept: CARDIAC REHAB | Age: 68
Setting detail: THERAPIES SERIES
Discharge: HOME | End: 2023-05-09
Payer: OTHER GOVERNMENT

## 2023-05-09 PROCEDURE — 93798 PHYS/QHP OP CAR RHAB W/ECG: CPT

## 2023-05-11 ENCOUNTER — HOSPITAL ENCOUNTER (OUTPATIENT)
Dept: CARDIAC REHAB | Age: 68
Setting detail: THERAPIES SERIES
Discharge: HOME | End: 2023-05-11
Payer: OTHER GOVERNMENT

## 2023-05-11 PROCEDURE — 93798 PHYS/QHP OP CAR RHAB W/ECG: CPT

## 2023-05-15 ENCOUNTER — HOSPITAL ENCOUNTER (OUTPATIENT)
Dept: CARDIAC REHAB | Age: 68
Setting detail: THERAPIES SERIES
Discharge: HOME | End: 2023-05-15
Payer: OTHER GOVERNMENT

## 2023-05-15 PROCEDURE — 93798 PHYS/QHP OP CAR RHAB W/ECG: CPT

## 2023-05-16 ENCOUNTER — HOSPITAL ENCOUNTER (OUTPATIENT)
Dept: CARDIAC REHAB | Age: 68
Setting detail: THERAPIES SERIES
Discharge: HOME | End: 2023-05-16
Payer: OTHER GOVERNMENT

## 2023-05-16 PROCEDURE — 93798 PHYS/QHP OP CAR RHAB W/ECG: CPT

## 2023-05-18 ENCOUNTER — HOSPITAL ENCOUNTER (OUTPATIENT)
Dept: CARDIAC REHAB | Age: 68
Setting detail: THERAPIES SERIES
Discharge: HOME | End: 2023-05-18
Payer: OTHER GOVERNMENT

## 2023-05-18 ENCOUNTER — OFFICE VISIT (OUTPATIENT)
Dept: CARDIOLOGY CLINIC | Age: 68
End: 2023-05-18
Payer: OTHER GOVERNMENT

## 2023-05-18 VITALS
BODY MASS INDEX: 34.26 KG/M2 | WEIGHT: 232 LBS | DIASTOLIC BLOOD PRESSURE: 83 MMHG | SYSTOLIC BLOOD PRESSURE: 134 MMHG | HEART RATE: 95 BPM | OXYGEN SATURATION: 96 %

## 2023-05-18 DIAGNOSIS — Z98.61 POST PTCA: Primary | ICD-10-CM

## 2023-05-18 DIAGNOSIS — I48.92 ATRIAL FLUTTER, UNSPECIFIED TYPE (HCC): ICD-10-CM

## 2023-05-18 DIAGNOSIS — E55.9 VITAMIN D DEFICIENCY DISEASE: ICD-10-CM

## 2023-05-18 DIAGNOSIS — E78.5 HYPERLIPIDEMIA, UNSPECIFIED HYPERLIPIDEMIA TYPE: ICD-10-CM

## 2023-05-18 PROCEDURE — 93798 PHYS/QHP OP CAR RHAB W/ECG: CPT

## 2023-05-18 PROCEDURE — 1123F ACP DISCUSS/DSCN MKR DOCD: CPT | Performed by: INTERNAL MEDICINE

## 2023-05-18 PROCEDURE — 99214 OFFICE O/P EST MOD 30 MIN: CPT | Performed by: INTERNAL MEDICINE

## 2023-05-18 RX ORDER — ROSUVASTATIN CALCIUM 40 MG/1
40 TABLET, COATED ORAL EVERY EVENING
COMMUNITY

## 2023-05-18 RX ORDER — ATORVASTATIN CALCIUM 80 MG/1
80 TABLET, FILM COATED ORAL DAILY
COMMUNITY
Start: 2023-05-09 | End: 2023-05-18

## 2023-05-18 RX ORDER — METOPROLOL SUCCINATE 25 MG/1
25 TABLET, EXTENDED RELEASE ORAL DAILY
COMMUNITY

## 2023-05-18 RX ORDER — CLOPIDOGREL BISULFATE 75 MG/1
75 TABLET ORAL DAILY
COMMUNITY
Start: 2023-03-10

## 2023-05-18 RX ORDER — ASPIRIN 81 MG/1
TABLET, COATED ORAL
COMMUNITY
Start: 2023-03-10 | End: 2023-05-18

## 2023-05-22 ENCOUNTER — APPOINTMENT (OUTPATIENT)
Dept: CARDIAC REHAB | Age: 68
End: 2023-05-22
Payer: OTHER GOVERNMENT

## 2023-05-23 ENCOUNTER — HOSPITAL ENCOUNTER (OUTPATIENT)
Dept: CARDIAC REHAB | Age: 68
Setting detail: THERAPIES SERIES
Discharge: HOME | End: 2023-05-23
Payer: OTHER GOVERNMENT

## 2023-05-23 PROCEDURE — 93798 PHYS/QHP OP CAR RHAB W/ECG: CPT

## 2023-05-24 NOTE — PROGRESS NOTES
Radha Chapman M.D. [address] [place], [place] [address]   [phone]          May 18, 2023          Parmjit Foley MD  [address] [place] [address]        RE:   Juani Rajput  :  1955      Dear Dr. Hussain Mason:    CHIEF COMPLAINT:  Status post cardiac catheterization on 03/10/2023 that showed severe 80% disease in the proximal LAD with unremarkable circumflex and nondominant right coronary artery, EF 50%, with angioplasty and stent placement in the proximal LAD using a 3.5 x 23 mm drug-eluting Xience stent. HISTORY OF PRESENT ILLNESS:  I had the pleasure of seeing Juani Rajput in our office on 2023. I trust you received my full H and P from 2023. He has a history of paroxysmal atrial fib/flutter. He also had shortness of breath and chest pain with an abnormal stress test.  We therefore proceeded with a cardiac catheterization that was done on 03/10/2023. This showed severe 80% disease in the proximal LAD with unremarkable circumflex and nondominant right coronary artery, EF 50%. We stented his LAD with a 3.5 x 23 mm drug-eluting Xience stent. He has done excellent since that time. He denies any chest pain or chest discomfort. He has not smoked for 3 months. He is doing cardiac rehab and is doing well. He has remained in sinus rhythm. He really has no complaints as I see him today. MEDICATIONS:  His medications are as follows:  Eliquis 5 mg b.i.d., vitamin D 2000 units daily, Plavix 75 mg daily Cardizem  mg daily, Lasix 20 mg daily, lisinopril 5 mg daily, Toprol XL 25 mg daily, Crestor 40 mg daily, Imdur 30 mg daily. PHYSICAL EXAMINATION:  VITAL SIGNS:  His blood pressure was 134/83 with a heart rate of 95 and regular. Respiratory rate 18. O2 sat 97%. Weight 232 pounds. GENERAL:  He is a very pleasant 69-year-old gentleman. Denied pain. He was oriented to person, place and time.

## 2023-05-25 ENCOUNTER — HOSPITAL ENCOUNTER (OUTPATIENT)
Dept: CARDIAC REHAB | Age: 68
Setting detail: THERAPIES SERIES
Discharge: HOME | End: 2023-05-25
Payer: OTHER GOVERNMENT

## 2023-05-25 PROCEDURE — 93798 PHYS/QHP OP CAR RHAB W/ECG: CPT

## 2023-05-25 NOTE — PROGRESS NOTES
Calcium Channel Blocker   [x] Other anticoagulation medications     Fall Risk assess: [x] Yes  [] No / REMAINS low RISK  Assistive Device:   [] Cane  [] Walker [] Wheel Chair  [] Gait belt    Patient/Program goal:   Overall Personal Program Goal:  \" BE ABLE TO BREATHE BETTER \"   [] Initial Goal   [x] Progressing to Goal / / PER VERBALIZED RECALL BY PT  [] Not Meeting--Needs Reinforcement  [] Goal Met  [] Goal Not Met     To increase stamina, strength, and flexibility by exercising 31-50 total minutes by engaging in aerobic, resistance, and flexibility workout modalities with the goal of progressively achieving at least 0.5 to 1.0 metabolic equivalant improvement in the next 30 days as evidenced by daily session reports / [] Initial Goal     [x] Progressing to Goal / SUBMAX TM GXT PEAK WL OF 5.5 METS AND AVG CARDIO FC 2.8 -- NOW IMPROVED TO 5.5 AVG METS [] Not Meeting--Needs Reinforcement   [] Goal Met  [] Goal Not Met     To achieve and progress prescribed exercise frequency, intensity, time, and type in the next 30 days based upon initial evaluation and submaximal graded exercise results as evidenced by the attaining and maintaining the prescribed target heart rate range, a Curtis rating of perceived exertion between 11 and 16, duration of >30 - 50 minutes using multiple exercise modes for at least 3 - 5 days per week to accumulate a minimum total of 2.5 hours per week of moderate aerobic intensity exercise, as tolerated, evidenced by daily session reports and home workout log /         [] Initial Goal   [x] Progressing to Goal / PT COMPLIANT--SEE DAILY NOTES [] Not Meeting--Needs Reinforcement   [] Goal Met  [] Goal Not Met      To gradually and progressively lose 2 - 4 lb of body weight in the next 30 days through moderating nutrional intake and performing regular aerobic and strength training exercises as prescribed with improvement evidenced by daily session report comparison / [] Initial Goal   [] Progressing to

## 2023-05-29 ENCOUNTER — HOSPITAL ENCOUNTER (OUTPATIENT)
Dept: CARDIAC REHAB | Age: 68
Setting detail: THERAPIES SERIES
End: 2023-05-29
Payer: OTHER GOVERNMENT

## 2023-05-30 ENCOUNTER — HOSPITAL ENCOUNTER (OUTPATIENT)
Dept: CARDIAC REHAB | Age: 68
Setting detail: THERAPIES SERIES
End: 2023-05-30
Payer: OTHER GOVERNMENT

## 2023-06-01 ENCOUNTER — HOSPITAL ENCOUNTER (OUTPATIENT)
Dept: CARDIAC REHAB | Age: 68
Setting detail: THERAPIES SERIES
Discharge: HOME | End: 2023-06-01
Payer: OTHER GOVERNMENT

## 2023-06-01 PROCEDURE — 93798 PHYS/QHP OP CAR RHAB W/ECG: CPT

## 2023-06-05 ENCOUNTER — HOSPITAL ENCOUNTER (OUTPATIENT)
Dept: CARDIAC REHAB | Age: 68
Setting detail: THERAPIES SERIES
Discharge: HOME | End: 2023-06-05
Payer: OTHER GOVERNMENT

## 2023-06-05 PROCEDURE — 93798 PHYS/QHP OP CAR RHAB W/ECG: CPT

## 2023-06-06 ENCOUNTER — HOSPITAL ENCOUNTER (OUTPATIENT)
Dept: CARDIAC REHAB | Age: 68
Setting detail: THERAPIES SERIES
Discharge: HOME | End: 2023-06-06
Payer: OTHER GOVERNMENT

## 2023-06-06 PROCEDURE — 93798 PHYS/QHP OP CAR RHAB W/ECG: CPT

## 2023-06-08 ENCOUNTER — HOSPITAL ENCOUNTER (OUTPATIENT)
Dept: CARDIAC REHAB | Age: 68
Setting detail: THERAPIES SERIES
Discharge: HOME | End: 2023-06-08
Payer: OTHER GOVERNMENT

## 2023-06-08 PROCEDURE — 93798 PHYS/QHP OP CAR RHAB W/ECG: CPT

## 2023-06-15 ENCOUNTER — HOSPITAL ENCOUNTER (OUTPATIENT)
Dept: CARDIAC REHAB | Age: 68
Setting detail: THERAPIES SERIES
End: 2023-06-15
Payer: OTHER GOVERNMENT

## 2023-06-19 ENCOUNTER — HOSPITAL ENCOUNTER (OUTPATIENT)
Dept: CARDIAC REHAB | Age: 68
Setting detail: THERAPIES SERIES
Discharge: HOME | End: 2023-06-19
Payer: OTHER GOVERNMENT

## 2023-06-19 PROCEDURE — 93798 PHYS/QHP OP CAR RHAB W/ECG: CPT

## 2023-06-20 ENCOUNTER — HOSPITAL ENCOUNTER (OUTPATIENT)
Dept: CARDIAC REHAB | Age: 68
Setting detail: THERAPIES SERIES
Discharge: HOME | End: 2023-06-20
Payer: OTHER GOVERNMENT

## 2023-06-20 PROCEDURE — 93798 PHYS/QHP OP CAR RHAB W/ECG: CPT

## 2023-06-22 ENCOUNTER — HOSPITAL ENCOUNTER (OUTPATIENT)
Dept: CARDIAC REHAB | Age: 68
Setting detail: THERAPIES SERIES
Discharge: HOME | End: 2023-06-22
Payer: OTHER GOVERNMENT

## 2023-06-22 PROCEDURE — 93798 PHYS/QHP OP CAR RHAB W/ECG: CPT

## 2023-06-26 ENCOUNTER — HOSPITAL ENCOUNTER (OUTPATIENT)
Dept: CARDIAC REHAB | Age: 68
Setting detail: THERAPIES SERIES
Discharge: HOME | End: 2023-06-26
Payer: OTHER GOVERNMENT

## 2023-06-26 PROCEDURE — 93798 PHYS/QHP OP CAR RHAB W/ECG: CPT

## 2023-06-27 ENCOUNTER — HOSPITAL ENCOUNTER (OUTPATIENT)
Dept: CARDIAC REHAB | Age: 68
Setting detail: THERAPIES SERIES
Discharge: HOME | End: 2023-06-27
Payer: OTHER GOVERNMENT

## 2023-06-27 PROCEDURE — 93798 PHYS/QHP OP CAR RHAB W/ECG: CPT

## 2023-06-29 ENCOUNTER — HOSPITAL ENCOUNTER (OUTPATIENT)
Dept: CARDIAC REHAB | Age: 68
Setting detail: THERAPIES SERIES
Discharge: HOME | End: 2023-06-29
Payer: OTHER GOVERNMENT

## 2023-06-29 PROCEDURE — 93798 PHYS/QHP OP CAR RHAB W/ECG: CPT

## 2023-07-06 ENCOUNTER — HOSPITAL ENCOUNTER (OUTPATIENT)
Dept: CARDIAC REHAB | Age: 68
Setting detail: THERAPIES SERIES
Discharge: HOME | End: 2023-07-06
Payer: OTHER GOVERNMENT

## 2023-07-06 PROCEDURE — 93798 PHYS/QHP OP CAR RHAB W/ECG: CPT

## 2023-07-06 NOTE — PROGRESS NOTES
Meeting--Needs Reinforcement  [] Goal Met  [] Goal Not Met     Component Ref Range & Units 4/13/22 10/24/19 3/8/12   Cholesterol, Total mg/dL 167  184  176 R    HDL 35 - 70 mg/dL 33 Abnormal   45  46    LDL Calculated 0 - 160 mg/dL 132  116  107    Triglycerides mg/dL 145  87  117    Chol/HDL Ratio       3.8    VLDL       23 R    Cholesterol non HDL              -To develop regular home aerobic exercise program for 20 - 60 minutes at least 2 non-rehab days per week, excluding  5 - 10 minutes warm-up and cool-down periods, within the next 30 days, being tracked on home workout log / [] Initial Goal   [x] Progressing to Goal / / PER VERBALIZED RECALL BY PT  [] Not Meeting--Needs Reinforcement  [x] Goal Met  [] Goal Not Met     To strive for a lower daily fasting blood glucose measures to <131 and random after meal measures to <181, if diabetic, at home via lifestyle educaton, behavior modification, and medication compliance in the next 30 days as tracked per patient's self-report / [] Initial Goal   [] Progressing to Goal / INITIAL HOME BS REPORTED TO  MG/DL [] Not Meeting--Needs Reinforcement  [x] Goal Met  [] Goal Not Met     To reduce self-reported psycho-social feelings of stress in the next 30 days as evidenced by pre- and post- surveys and by routine rounding with patient to ascertain subjective improvements / [] Initial Goal   [] Progressing to Goal  [] Not Meeting--Needs Reinforcement  [] Goal Met  [] N/A  Q.OL. HEALTH & FUNCTIONING RATING 20.60 25.50 24%   Q.O.L. SOCIAL & ECONOMIC RATING 23.60 25.40 8%   Q.O.L. PSYCHOLOGICAL/SPIRITUAL RATING 21.90 22.70 4%   Q. O.L FAMILY SELF-RATING 19.20 19.80 3%   Q.O.L. OVERALL INDEX SELF-RATING 21.30 24.10 13%   PHQ-9 DEPRESSION AND MOOD RATING 0.00 0.00 N/A   LAUREN-7 ANXIETY SCORE 0.00 0.00 N/A        In the next 30-days, to eat on average at least 2 servings of fruit per day and 4-5 servings of vegetables per day as evidenced by pre- and post-program nutriton survey

## 2023-07-10 ENCOUNTER — HOSPITAL ENCOUNTER (OUTPATIENT)
Dept: CARDIAC REHAB | Age: 68
Setting detail: THERAPIES SERIES
End: 2023-07-10
Payer: OTHER GOVERNMENT

## 2023-07-11 ENCOUNTER — APPOINTMENT (OUTPATIENT)
Dept: CARDIAC REHAB | Age: 68
End: 2023-07-11
Payer: OTHER GOVERNMENT

## 2023-07-13 ENCOUNTER — APPOINTMENT (OUTPATIENT)
Dept: CARDIAC REHAB | Age: 68
End: 2023-07-13
Payer: OTHER GOVERNMENT

## 2023-11-06 ENCOUNTER — HOSPITAL ENCOUNTER (OUTPATIENT)
Age: 68
Discharge: HOME | End: 2023-11-06
Payer: OTHER GOVERNMENT

## 2023-11-06 DIAGNOSIS — E55.9 VITAMIN D DEFICIENCY DISEASE: ICD-10-CM

## 2023-11-06 DIAGNOSIS — Z98.61 POST PTCA: ICD-10-CM

## 2023-11-06 DIAGNOSIS — E78.5 HYPERLIPIDEMIA, UNSPECIFIED HYPERLIPIDEMIA TYPE: ICD-10-CM

## 2023-11-06 DIAGNOSIS — I48.92 ATRIAL FLUTTER, UNSPECIFIED TYPE (HCC): ICD-10-CM

## 2023-11-06 LAB
25(OH)D3 SERPL-MCNC: 54.8 NG/ML (ref 30–100)
ALBUMIN SERPL-MCNC: 4.1 G/DL (ref 3.5–5.2)
ALP SERPL-CCNC: 67 U/L (ref 40–129)
ALT SERPL-CCNC: 28 U/L (ref 5–41)
ANION GAP SERPL CALCULATED.3IONS-SCNC: 9 MMOL/L (ref 9–17)
AST SERPL-CCNC: 21 U/L
BASOPHILS # BLD: 0.02 K/UL (ref 0–0.2)
BASOPHILS NFR BLD: 0 % (ref 0–2)
BILIRUB SERPL-MCNC: 0.9 MG/DL (ref 0.3–1.2)
BUN SERPL-MCNC: 16 MG/DL (ref 8–23)
BUN/CREAT SERPL: 15 (ref 9–20)
CALCIUM SERPL-MCNC: 9.1 MG/DL (ref 8.6–10.4)
CHLORIDE SERPL-SCNC: 100 MMOL/L (ref 98–107)
CHOLEST SERPL-MCNC: 106 MG/DL (ref 0–199)
CHOLESTEROL/HDL RATIO: 3
CO2 SERPL-SCNC: 26 MMOL/L (ref 20–31)
CREAT SERPL-MCNC: 1.1 MG/DL (ref 0.7–1.2)
EKG ATRIAL RATE: 258 BPM
EKG P AXIS: -97 DEGREES
EKG Q-T INTERVAL: 376 MS
EKG QRS DURATION: 108 MS
EKG QTC CALCULATION (BAZETT): 411 MS
EKG R AXIS: 80 DEGREES
EKG T AXIS: 80 DEGREES
EKG VENTRICULAR RATE: 72 BPM
EOSINOPHIL # BLD: 0.04 K/UL (ref 0–0.4)
EOSINOPHILS RELATIVE PERCENT: 1 % (ref 0–5)
ERYTHROCYTE [DISTWIDTH] IN BLOOD BY AUTOMATED COUNT: 12.6 % (ref 12.1–15.2)
GFR SERPL CREATININE-BSD FRML MDRD: >60 ML/MIN/1.73M2
GLUCOSE SERPL-MCNC: 143 MG/DL (ref 70–99)
HCT VFR BLD AUTO: 49.6 % (ref 41–53)
HDLC SERPL-MCNC: 41 MG/DL
HGB BLD-MCNC: 16.9 G/DL (ref 13.5–17.5)
IMM GRANULOCYTES # BLD AUTO: 0.01 K/UL (ref 0–0.3)
IMM GRANULOCYTES NFR BLD: 0 % (ref 0–5)
LDLC SERPL CALC-MCNC: 37 MG/DL (ref 0–100)
LYMPHOCYTES NFR BLD: 1.53 K/UL (ref 1–4.8)
LYMPHOCYTES RELATIVE PERCENT: 24 % (ref 13–44)
MAGNESIUM SERPL-MCNC: 2.4 MG/DL (ref 1.6–2.6)
MCH RBC QN AUTO: 30.5 PG (ref 26–34)
MCHC RBC AUTO-ENTMCNC: 34.1 G/DL (ref 31–37)
MCV RBC AUTO: 89.4 FL (ref 80–100)
MONOCYTES NFR BLD: 0.53 K/UL (ref 0–1)
MONOCYTES NFR BLD: 8 % (ref 5–9)
NEUTROPHILS NFR BLD: 66 % (ref 39–75)
NEUTS SEG NFR BLD: 4.15 K/UL (ref 2.1–6.5)
PATIENT FASTING?: YES
PLATELET # BLD AUTO: 249 K/UL (ref 140–450)
PMV BLD AUTO: 9.1 FL (ref 6–12)
POTASSIUM SERPL-SCNC: 5 MMOL/L (ref 3.7–5.3)
PROT SERPL-MCNC: 7 G/DL (ref 6.4–8.3)
RBC # BLD AUTO: 5.55 M/UL (ref 4.5–5.9)
SODIUM SERPL-SCNC: 135 MMOL/L (ref 135–144)
TRIGL SERPL-MCNC: 142 MG/DL (ref 0–149)
TSH SERPL DL<=0.05 MIU/L-ACNC: 2.42 UIU/ML (ref 0.3–5)
VLDLC SERPL CALC-MCNC: 28 MG/DL
WBC OTHER # BLD: 6.3 K/UL (ref 3.5–11)

## 2023-11-06 PROCEDURE — 93010 ELECTROCARDIOGRAM REPORT: CPT | Performed by: INTERNAL MEDICINE

## 2023-11-06 PROCEDURE — 80053 COMPREHEN METABOLIC PANEL: CPT

## 2023-11-06 PROCEDURE — 82306 VITAMIN D 25 HYDROXY: CPT

## 2023-11-06 PROCEDURE — 83735 ASSAY OF MAGNESIUM: CPT

## 2023-11-06 PROCEDURE — 85025 COMPLETE CBC W/AUTO DIFF WBC: CPT

## 2023-11-06 PROCEDURE — 84443 ASSAY THYROID STIM HORMONE: CPT

## 2023-11-06 PROCEDURE — 36415 COLL VENOUS BLD VENIPUNCTURE: CPT

## 2023-11-06 PROCEDURE — 93005 ELECTROCARDIOGRAM TRACING: CPT

## 2023-11-06 PROCEDURE — 80061 LIPID PANEL: CPT

## 2023-11-07 ENCOUNTER — OFFICE VISIT (OUTPATIENT)
Dept: CARDIOLOGY CLINIC | Age: 68
End: 2023-11-07

## 2023-11-07 VITALS
WEIGHT: 230 LBS | BODY MASS INDEX: 33.97 KG/M2 | DIASTOLIC BLOOD PRESSURE: 70 MMHG | SYSTOLIC BLOOD PRESSURE: 120 MMHG | OXYGEN SATURATION: 95 % | HEART RATE: 100 BPM

## 2023-11-07 DIAGNOSIS — E78.5 HYPERLIPIDEMIA, UNSPECIFIED HYPERLIPIDEMIA TYPE: ICD-10-CM

## 2023-11-07 DIAGNOSIS — E55.9 VITAMIN D DEFICIENCY DISEASE: ICD-10-CM

## 2023-11-07 DIAGNOSIS — I48.92 ATRIAL FLUTTER, UNSPECIFIED TYPE (HCC): ICD-10-CM

## 2023-11-07 DIAGNOSIS — Z98.61 POST PTCA: Primary | ICD-10-CM

## 2023-11-07 RX ORDER — DILTIAZEM HYDROCHLORIDE 120 MG/1
120 CAPSULE, COATED, EXTENDED RELEASE ORAL 2 TIMES DAILY
Qty: 60 CAPSULE | Refills: 11 | Status: SHIPPED | OUTPATIENT
Start: 2023-11-07

## 2023-11-07 NOTE — PROGRESS NOTES
Ov DR Ruy John 6 mth follow up   No chest pain   Sob walking up hill. No hospitalizations or procedures  Since seen. EKG done in room still in a fib  Will increase diltiazem to 120 mg bid  Repeat EKG in 2 weeks   If still in a fib will set up for   Cardioversion. See in 1 year.

## 2023-11-09 DIAGNOSIS — I48.92 ATRIAL FLUTTER, UNSPECIFIED TYPE (HCC): ICD-10-CM

## 2023-11-29 ENCOUNTER — TELEPHONE (OUTPATIENT)
Dept: CARDIOLOGY CLINIC | Age: 68
End: 2023-11-29

## 2023-11-29 NOTE — TELEPHONE ENCOUNTER
Aisha Tellez called to state that after reviewing his medication list again, that he is not taking Metoprolol. He stated that he thought Dr. Suzy Cesar discontinued it. He stated that he has not been taking it for a long time. He stated that he will come in next week to get the EKG after the Diltiazem was increased and he would like to proceed with the cardioversion if needed. He stated that he feels like he is more SOB than before. Hgb stable at 11  Platelets mildly low still, but stable  Does need to follow up with hematology for yearly follow up, very important

## 2023-12-06 ENCOUNTER — HOSPITAL ENCOUNTER (OUTPATIENT)
Age: 68
Discharge: HOME | End: 2023-12-06
Payer: OTHER GOVERNMENT

## 2023-12-06 DIAGNOSIS — I48.92 ATRIAL FLUTTER, UNSPECIFIED TYPE (HCC): ICD-10-CM

## 2023-12-06 LAB
EKG ATRIAL RATE: 261 BPM
EKG P AXIS: -122 DEGREES
EKG Q-T INTERVAL: 390 MS
EKG QRS DURATION: 90 MS
EKG QTC CALCULATION (BAZETT): 469 MS
EKG R AXIS: 79 DEGREES
EKG T AXIS: 72 DEGREES
EKG VENTRICULAR RATE: 87 BPM

## 2023-12-06 PROCEDURE — 93010 ELECTROCARDIOGRAM REPORT: CPT | Performed by: INTERNAL MEDICINE

## 2023-12-06 PROCEDURE — 93005 ELECTROCARDIOGRAM TRACING: CPT

## 2024-03-15 RX ORDER — DILTIAZEM HYDROCHLORIDE 120 MG/1
120 CAPSULE, COATED, EXTENDED RELEASE ORAL 2 TIMES DAILY
Qty: 180 CAPSULE | Refills: 3 | Status: SHIPPED | OUTPATIENT
Start: 2024-03-15

## 2024-03-15 RX ORDER — DILTIAZEM HYDROCHLORIDE 120 MG/1
120 CAPSULE, COATED, EXTENDED RELEASE ORAL 2 TIMES DAILY
Qty: 180 CAPSULE | Refills: 3 | Status: SHIPPED | OUTPATIENT
Start: 2024-03-15 | End: 2024-03-15 | Stop reason: SDUPTHER

## 2024-03-22 RX ORDER — DILTIAZEM HYDROCHLORIDE 120 MG/1
120 CAPSULE, COATED, EXTENDED RELEASE ORAL 2 TIMES DAILY
Qty: 60 CAPSULE | Refills: 3 | Status: SHIPPED | OUTPATIENT
Start: 2024-03-22

## 2024-05-02 ENCOUNTER — HOSPITAL ENCOUNTER (OUTPATIENT)
Age: 69
Discharge: HOME | End: 2024-05-02

## 2024-05-02 DIAGNOSIS — E55.9 VITAMIN D DEFICIENCY DISEASE: ICD-10-CM

## 2024-05-02 DIAGNOSIS — Z98.61 POST PTCA: ICD-10-CM

## 2024-05-02 DIAGNOSIS — E78.5 HYPERLIPIDEMIA, UNSPECIFIED HYPERLIPIDEMIA TYPE: ICD-10-CM

## 2024-05-02 DIAGNOSIS — I48.92 ATRIAL FLUTTER, UNSPECIFIED TYPE (HCC): ICD-10-CM

## 2024-05-02 LAB
EKG ATRIAL RATE: 255 BPM
EKG P AXIS: -82 DEGREES
EKG Q-T INTERVAL: 320 MS
EKG QRS DURATION: 110 MS
EKG QTC CALCULATION (BAZETT): 380 MS
EKG R AXIS: 86 DEGREES
EKG T AXIS: 65 DEGREES
EKG VENTRICULAR RATE: 85 BPM

## 2024-05-09 ENCOUNTER — OFFICE VISIT (OUTPATIENT)
Dept: CARDIOLOGY CLINIC | Age: 69
End: 2024-05-09
Payer: OTHER GOVERNMENT

## 2024-05-09 VITALS
DIASTOLIC BLOOD PRESSURE: 60 MMHG | HEART RATE: 88 BPM | SYSTOLIC BLOOD PRESSURE: 102 MMHG | BODY MASS INDEX: 33.08 KG/M2 | OXYGEN SATURATION: 96 % | WEIGHT: 224 LBS

## 2024-05-09 DIAGNOSIS — E55.9 VITAMIN D DEFICIENCY DISEASE: ICD-10-CM

## 2024-05-09 DIAGNOSIS — I48.92 ATRIAL FLUTTER, UNSPECIFIED TYPE (HCC): Primary | ICD-10-CM

## 2024-05-09 DIAGNOSIS — E78.5 HYPERLIPIDEMIA, UNSPECIFIED HYPERLIPIDEMIA TYPE: ICD-10-CM

## 2024-05-09 DIAGNOSIS — Z98.61 POST PTCA: ICD-10-CM

## 2024-05-09 PROCEDURE — 1123F ACP DISCUSS/DSCN MKR DOCD: CPT | Performed by: INTERNAL MEDICINE

## 2024-05-09 PROCEDURE — 99214 OFFICE O/P EST MOD 30 MIN: CPT | Performed by: INTERNAL MEDICINE

## 2024-05-09 RX ORDER — SPIRONOLACTONE 25 MG/1
25 TABLET ORAL DAILY
COMMUNITY

## 2024-05-09 RX ORDER — ASPIRIN 81 MG/1
81 TABLET ORAL DAILY
COMMUNITY

## 2024-05-09 NOTE — PROGRESS NOTES
Ov DR Meza follow up  No chest pain   Some sob no worse  Occ dizziness.    Bedside echo done.     To call in with meds    To stop plavix and lisinopril  And start asa     Will see in 1 year.    Pt called in meds update   On med list informed to   Stop the plavix and lisinopril

## 2024-05-15 NOTE — PROGRESS NOTES
Pawan Meza M.D.  [facility]  [facility]  [address] [id number]  [phone]        May 09, 2024        RE:  VAISHNAVI LOCKHART  :  1955    CHIEF COMPLAINT:    Severe coronary artery disease, status post cardiac catheterization on March 10, 2023, showing 80% proximal LAD with an unremarkable right coronary artery and circumflex, EF 50% with angioplasty and stent placement of proximal LAD placing a 3.5 x 23 mm drug-eluting Xience stent.  History of paroxysmal atrial flutter, which has remained with him anticoagulated with Eliquis 5 mg b.i.d.    HISTORY OF PRESENT ILLNESS:  I had the pleasure of seeing Vaishnavi Lockhart in our office on May 9, 2024.  He is a very pleasant 69-year-old gentleman who had mild LV dysfunction, EF in the 45% to 50% range.    On May 4, 2022, he had noticed slight dyspnea on exertion.  In 2022, he had some chest discomfort.  An echocardiogram at the [facility] showed an EF of 45% to 50%. He was in atrial flutter and placed on Eliquis 5 mg b.i.d. and Lasix 20 mg daily.    I did a Lexiscan cardiac stress test followed by cardioversion on May 25, 2022, to sinus rhythm, he felt better.    His Lexiscan showed inferior wall ischemia.  We did another cardioversion, but he reverted back to atrial flutter.    We decided to proceed directly with catheterization, which was done on March 10, 2023, which showed 80% disease in the proximal LAD with unremarkable circumflex and nondominant right coronary artery.  EF 50% with stenting of the proximal LAD with a 3.5 x 23 mm Xience stent.    He has done well since that time.  He denies any chest pain or chest discomfort.  He has some shortness of breath, which is about at baseline.  Occasional dizziness.  He has never had any PND, orthopnea, or pedal edema.  No syncope, near-syncope.  Cannot feel his atrial flutter.    CARDIAC RISK FACTORS:  Smoking: Negative (Stopped in March  Constitutional: no fever, chills, no recent weight loss, change in appetite or malaise  Eyes: no redness/discharge/pain/vision changes  ENT: no rhinorrhea/ear pain/sore throat  Cardiac: No chest pain. No edema.  Respiratory: + sob. No cough or respiratory distress  GI: No nausea, vomiting, diarrhea or abdominal pain.  : No dysuria, frequency, urgency or hematuria  MS: no pain to back or extremities, no loss of ROM, no weakness  Neuro: No headache or weakness. No LOC.  Skin: No skin rash.  Endocrine: No history of thyroid disease or diabetes.  Except as documented in the HPI, all other systems are negative.

## 2025-02-28 RX ORDER — VALACYCLOVIR HYDROCHLORIDE 500 MG/1
TABLET, FILM COATED ORAL
Qty: 30 TABLET | Refills: 1 | Status: SHIPPED | OUTPATIENT
Start: 2025-02-28

## 2025-04-07 DIAGNOSIS — Z12.11 SCREENING FOR COLON CANCER: ICD-10-CM

## 2025-04-10 ENCOUNTER — OFFICE VISIT (OUTPATIENT)
Dept: FAMILY MEDICINE CLINIC | Age: 70
End: 2025-04-10

## 2025-04-10 VITALS
HEIGHT: 69 IN | DIASTOLIC BLOOD PRESSURE: 88 MMHG | BODY MASS INDEX: 35.4 KG/M2 | WEIGHT: 239 LBS | SYSTOLIC BLOOD PRESSURE: 138 MMHG | HEART RATE: 86 BPM

## 2025-04-10 DIAGNOSIS — I25.10 CORONARY ARTERY DISEASE INVOLVING NATIVE CORONARY ARTERY OF NATIVE HEART WITHOUT ANGINA PECTORIS: ICD-10-CM

## 2025-04-10 DIAGNOSIS — R06.02 SHORTNESS OF BREATH: Primary | ICD-10-CM

## 2025-04-10 DIAGNOSIS — I48.0 PAROXYSMAL ATRIAL FIBRILLATION (HCC): ICD-10-CM

## 2025-04-10 DIAGNOSIS — R73.03 PRE-DIABETES: ICD-10-CM

## 2025-04-10 DIAGNOSIS — M79.644 PAIN OF FINGER OF RIGHT HAND: ICD-10-CM

## 2025-04-10 DIAGNOSIS — D49.2: ICD-10-CM

## 2025-04-10 DIAGNOSIS — I10 PRIMARY HYPERTENSION: ICD-10-CM

## 2025-04-10 DIAGNOSIS — E78.2 MIXED HYPERCHOLESTEROLEMIA AND HYPERTRIGLYCERIDEMIA: ICD-10-CM

## 2025-04-10 PROCEDURE — 1123F ACP DISCUSS/DSCN MKR DOCD: CPT | Performed by: INTERNAL MEDICINE

## 2025-04-10 PROCEDURE — 3075F SYST BP GE 130 - 139MM HG: CPT | Performed by: INTERNAL MEDICINE

## 2025-04-10 PROCEDURE — 3079F DIAST BP 80-89 MM HG: CPT | Performed by: INTERNAL MEDICINE

## 2025-04-10 PROCEDURE — 99214 OFFICE O/P EST MOD 30 MIN: CPT | Performed by: INTERNAL MEDICINE

## 2025-04-10 SDOH — ECONOMIC STABILITY: FOOD INSECURITY: WITHIN THE PAST 12 MONTHS, THE FOOD YOU BOUGHT JUST DIDN'T LAST AND YOU DIDN'T HAVE MONEY TO GET MORE.: NEVER TRUE

## 2025-04-10 SDOH — ECONOMIC STABILITY: FOOD INSECURITY: WITHIN THE PAST 12 MONTHS, YOU WORRIED THAT YOUR FOOD WOULD RUN OUT BEFORE YOU GOT MONEY TO BUY MORE.: NEVER TRUE

## 2025-04-10 ASSESSMENT — ENCOUNTER SYMPTOMS
SORE THROAT: 0
NAUSEA: 0
CONSTIPATION: 0
SHORTNESS OF BREATH: 1
BLOOD IN STOOL: 0
ABDOMINAL PAIN: 0
DIARRHEA: 0

## 2025-04-10 ASSESSMENT — PATIENT HEALTH QUESTIONNAIRE - PHQ9
SUM OF ALL RESPONSES TO PHQ QUESTIONS 1-9: 0
2. FEELING DOWN, DEPRESSED OR HOPELESS: NOT AT ALL
SUM OF ALL RESPONSES TO PHQ QUESTIONS 1-9: 0
SUM OF ALL RESPONSES TO PHQ QUESTIONS 1-9: 0
1. LITTLE INTEREST OR PLEASURE IN DOING THINGS: NOT AT ALL
SUM OF ALL RESPONSES TO PHQ QUESTIONS 1-9: 0

## 2025-04-10 NOTE — PROGRESS NOTES
finger foreign object for the past year.  He has had this for the past year.  He feels this was when he grabbed a roto tiller handle.      Past Medical History:  No date: Hyperlipidemia  No date: Primary hypertension    Past Surgical History:  No date: ACHILLES TENDON SURGERY  No date: APPENDECTOMY  2012: COLONOSCOPY      Comment:  4 adenomatous polyps  2016: COLONOSCOPY      Comment:  1 tubular adenoma  2022: COLONOSCOPY; N/A      Comment:   Back:  6 adenomatous polyps  03/10/2023: CORONARY ANGIOPLASTY WITH STENT PLACEMENT      Comment:  Dr. Meza @ [facility]--Stenting X 1--LAD  No date: HERNIA REPAIR  No date: TONSILLECTOMY  No date: VASCULAR SURGERY    Social History    Socioeconomic History      Marital status:       Spouse name: Not on file      Number of children: Not on file      Years of education: Not on file      Highest education level: Not on file    Occupational History      Not on file    Tobacco Use      Smoking status: Former        Packs/day: 0.00        Years: 1 pack/day for 40.0 years (40.0 ttl pk-yrs)        Types: Cigarettes        Start date: 1983        Quit date: 2023        Years since quittin.1      Smokeless tobacco: Never    Substance and Sexual Activity      Alcohol use: Yes        Alcohol/week: 12.0 standard drinks of alcohol        Types: 12 Cans of beer per week        Comment: per week      Drug use: Not on file      Sexual activity: Not on file    Other Topics      Concerns:        Not on file    Social History Narrative      Not on file    Social Drivers of Health  Financial Resource Strain: Low Risk  (2022)      Overall Financial Resource Strain (CARDIA)          Difficulty of Paying Living Expenses: Not very hard  Food Insecurity: No Food Insecurity (4/10/2025)      Hunger Vital Sign          Worried About Running Out of Food in the Last Year: Never true          Ran Out of Food in the Last Year: Never true  Transportation Needs: No

## 2025-04-10 NOTE — PATIENT INSTRUCTIONS
Assessment & Plan  Shortness of breath   Recommend he stops vaping.  Recommend a PFT to further evaluate his lung disease and to see if he has a reversible component  (asthma).          Neoplasm of skin of abdomen   Return for punch biopsy.  Concerned about possible melanoma with the color / characteristics.         Primary hypertension   Controlled on Cardizem CD, Lasix, Aldactone, and Toprol XL.          Coronary artery disease involving native coronary artery of native heart without angina pectoris   No increase in CP.  Recommend starting back on aspirin and taking Toprol XL.           Mixed hypercholesterolemia and hypertriglyceridemia   Controlled on Crestor.  No change in medication.          Pain of finger of right hand   Discussed having an xray to rule out foreign object.  [facility]         Pre-diabetes   Discussed watching a strict diabetic diet.  Take 1 tbsp of apple cider vinegar before each meal.          Paroxysmal atrial fibrillation (HCC)   NSR today.   ON Cardizem, Toprol XL, and Eliquis.   Follows with Dr. Meza.          Manjeet was instructed to follow up in the clinic in 1 year  for check up or as needed with any medical issues.

## 2025-04-11 PROBLEM — E11.65 UNCONTROLLED TYPE 2 DIABETES MELLITUS WITH HYPERGLYCEMIA (HCC): Status: ACTIVE | Noted: 2025-04-11

## 2025-04-29 ENCOUNTER — OFFICE VISIT (OUTPATIENT)
Dept: CARDIOLOGY CLINIC | Age: 70
End: 2025-04-29
Payer: OTHER GOVERNMENT

## 2025-04-29 VITALS
SYSTOLIC BLOOD PRESSURE: 126 MMHG | WEIGHT: 237 LBS | HEART RATE: 83 BPM | OXYGEN SATURATION: 95 % | RESPIRATION RATE: 16 BRPM | BODY MASS INDEX: 35 KG/M2 | DIASTOLIC BLOOD PRESSURE: 74 MMHG

## 2025-04-29 DIAGNOSIS — Z98.61 POST PTCA: ICD-10-CM

## 2025-04-29 DIAGNOSIS — I25.83 CORONARY ARTERY DISEASE DUE TO LIPID RICH PLAQUE: ICD-10-CM

## 2025-04-29 DIAGNOSIS — I25.10 CORONARY ARTERY DISEASE DUE TO LIPID RICH PLAQUE: ICD-10-CM

## 2025-04-29 DIAGNOSIS — I48.92 ATRIAL FLUTTER, UNSPECIFIED TYPE (HCC): Primary | ICD-10-CM

## 2025-04-29 DIAGNOSIS — E55.9 VITAMIN D DEFICIENCY DISEASE: ICD-10-CM

## 2025-04-29 DIAGNOSIS — E78.5 HYPERLIPIDEMIA, UNSPECIFIED HYPERLIPIDEMIA TYPE: ICD-10-CM

## 2025-04-29 PROCEDURE — 99214 OFFICE O/P EST MOD 30 MIN: CPT | Performed by: NURSE PRACTITIONER

## 2025-04-29 PROCEDURE — 1123F ACP DISCUSS/DSCN MKR DOCD: CPT | Performed by: NURSE PRACTITIONER

## 2025-04-29 NOTE — PROGRESS NOTES
[facility]  [address] [place] [address]  Phone: [phone], Fax: [phone]     Patient: Manjeet Portillo  : 1955  Date of Visit: 2025    REASON FOR VISIT / CONSULTATION: Hypertension (1 yr f/u )      History of Present Illness:        Dear Truman Fox MD    We had the pleasure of seeing Manjeet Portillo in our office today. Mr. Portillo is a pleasant 70 y.o. male with a history of severe coronary artery disease, paroxysmal atrial flutter, hypertension, hyperlipidemia and Diabetes mellitus type 2.    On May 4, 2022, he had noticed slight dyspnea on exertion.  In 2022, he had some chest discomfort.  An echocardiogram at the [facility] showed an EF of 45% to 50%.  He was in atrial flutter and placed on Eliquis 5 mg b.i.d. and Lasix 20 mg daily.     Dr. Meza did a Lexiscan cardiac stress test on May 9, 2022 that showed inferior wall ischemia.  This was followed by cardioversion on May 25, 2022, to sinus rhythm and he felt better.     We did another cardioversion, but he reverted back to atrial flutter.     We decided to proceed directly with catheterization, which was done on March 10, 2023, which showed 80% disease in the proximal LAD with unremarkable circumflex and nondominant right coronary artery.  EF 50% with stenting of the proximal LAD with a 3.5 x 23 mm Xience stent.     He has been doing well since his PTCA.  He has had no hospitalizations, ER visits or procedures since last visit.  He is scheduled for colonoscopy on 2025 with Dr. Fox.    Manjeet Portillo has no abdominal pain, nausea or vomiting at this time.   He has had no PND, orthopnea or pedal edema.   He is walking well with no unusual shortness of breath.  He has had no dizziness, lightheadedness or fainting spells.  He has had no change in energy level.  He stopped taking his aspirin 81 mg due to nosebleeds.    Mr. Portillo denies any chest pain now or in the recent

## 2025-04-29 NOTE — PROGRESS NOTES
Ov with Estela for one year follow up   Pt states \"everything is the same\" per pt   Regarding meds   No hospitalizations/procedures/er visits  No chest pain  No palpations   No new sob \"same\"   No dizziness  No lightheadedness   Not taking asa d/t nose bleeds   PER PT   Did not get labs EKG  [facility]   Per pt

## 2025-05-07 PROBLEM — Z12.11 SCREENING FOR COLON CANCER: Status: RESOLVED | Noted: 2025-04-07 | Resolved: 2025-05-07

## 2025-06-13 ENCOUNTER — OFFICE VISIT (OUTPATIENT)
Dept: FAMILY MEDICINE CLINIC | Age: 70
End: 2025-06-13

## 2025-06-13 VITALS
HEIGHT: 68 IN | DIASTOLIC BLOOD PRESSURE: 74 MMHG | SYSTOLIC BLOOD PRESSURE: 124 MMHG | HEART RATE: 84 BPM | WEIGHT: 228 LBS | BODY MASS INDEX: 34.56 KG/M2

## 2025-06-13 DIAGNOSIS — Z86.0101 H/O ADENOMATOUS POLYP OF COLON: ICD-10-CM

## 2025-06-13 DIAGNOSIS — Z01.818 PRE-OP TESTING: ICD-10-CM

## 2025-06-13 DIAGNOSIS — Z12.11 COLON CANCER SCREENING: Primary | ICD-10-CM

## 2025-06-13 RX ORDER — SODIUM, POTASSIUM,MAG SULFATES 17.5-3.13G
SOLUTION, RECONSTITUTED, ORAL ORAL
Qty: 1 EACH | Refills: 0 | Status: CANCELLED | OUTPATIENT
Start: 2025-06-13

## 2025-06-13 ASSESSMENT — ENCOUNTER SYMPTOMS
DIARRHEA: 0
RESPIRATORY NEGATIVE: 1
SORE THROAT: 0
CONSTIPATION: 0
NAUSEA: 0
ABDOMINAL PAIN: 0
BLOOD IN STOOL: 0

## 2025-06-13 NOTE — PROGRESS NOTES
Review of Systems   Constitutional: Negative.    HENT:  Negative for congestion, ear pain, postnasal drip, sneezing and sore throat.    Eyes:  Negative for visual disturbance.   Respiratory: Negative.     Cardiovascular:  Negative for chest pain, palpitations and leg swelling.   Gastrointestinal:  Negative for abdominal pain, blood in stool, constipation, diarrhea and nausea.   Genitourinary:  Negative for difficulty urinating, dysuria, frequency and urgency.   Musculoskeletal:  Negative for arthralgias, joint swelling, myalgias, neck pain and neck stiffness.   Skin: Negative.    Neurological:  Negative for syncope.   Psychiatric/Behavioral: Negative.            Objective   Physical Exam  Vitals and nursing note reviewed.   Constitutional:       Appearance: He is well-developed. He is obese.   HENT:      Head: Atraumatic.   Eyes:      Conjunctiva/sclera: Conjunctivae normal.   Cardiovascular:      Rate and Rhythm: Normal rate and regular rhythm.      Heart sounds: Murmur heard.   Pulmonary:      Effort: Pulmonary effort is normal.      Breath sounds: Wheezing present.   Abdominal:      Palpations: Abdomen is soft.      Tenderness: There is no abdominal tenderness.   Musculoskeletal:      Cervical back: Normal range of motion and neck supple.   Lymphadenopathy:      Cervical: No cervical adenopathy.   Skin:     Findings: No rash.   Neurological:      Mental Status: He is alert.   Psychiatric:         Behavior: Behavior normal.         Thought Content: Thought content normal.                  An electronic signature was used to authenticate this note.    --Truman Fox MD

## 2025-06-13 NOTE — PATIENT INSTRUCTIONS
Survey:     You may be receiving a survey from [facility]"[employer]" regarding your visit today.     You may get this in the mail, through your MyChart or in your email.      Please complete the survey to enable us to provide the highest quality of care to you and your family. Please also, mention our names.     If you cannot score us as very good (5 Stars) on any question, please feel free to call the office to discuss how we could have made your experience exceptional.      Thank You!        Dr. Fox, MD Damico, GRAY Hammonds, KALYN Swift, ARIELA Nguyen, KALYN Manzo, CMA       Assessment & Plan  Colon cancer screening   Set up for screening colonoscopy.  Risk and benefits of the procedure explained including risk for infection, bleeding, perforation, and death.  Verbal and written informed consent obtained.  The bowel prep was explained to Manjeet and he was instructed to start the prep the day before the procedure (printed directions were given).  Avoid corn 1 week prior to the procedure as this can cause suctioning difficulties during the procedure.  Avoid eating or drinking at least 8 hours prior to the procedure.  Manjeet was encouraged to call the clinic if he has any questions prior to the procedure.     Orders:    COLONOSCOPY W/ OR W/O BIOPSY; Future    H/O adenomatous polyp of colon            Pre-op testing   [facility]         Follow up after the Colonoscopy.

## 2025-06-19 NOTE — PROGRESS NOTES
[facility]   Preadmission Testing    Name: Manjeet Portillo  : 1955  Patient Phone: [phone] (home)     Procedure: colonoscopy  Date of Procedure:   Surgeon: Truman Fox MD    Ht:  172.7 cm (5' 8\")  Wt: 102.1 kg (225 lb)  Wt method: Stated    Allergies: No Known Allergies             There were no vitals filed for this visit.    No LMP for male patient.    Do you take blood thinners?   [x] Yes    [] No         Instructed to stop blood thinners prior to procedure?    [x] Yes    [] No      [] N/A  eliquis   Do you have sleep apnea?   [x] Yes    [] No     Do you have acid reflux ?   [] Yes    [x] No     Do you have  hiatal hernia?   [] Yes    [x] No    Do you ever experience motion sickness?   [] Yes    [x] No     Have you had a respiratory infection or sore throat in last 4 weeks before surgery?    [] Yes    [x] No     Do you have poorly controlled asthma or COPD?  Difficulty with intubation in past? [] Yes    [x] No      [] Yes    [x] No       Do you have a history of angina in the last month or symptomatic arrhythmia?   [] Yes    [x] No     Do you have significant central nervous system disease?   [] Yes    [x] No     Have you had an EKG, labs, or chest xray in last 12 months?  If yes provide copies to anesthesia   [x] Yes    [] No       [] Lab    [x] EKG    [] CXR     Have you had a stress test?     [x] Yes    [] No    When/where:[facility]     Was it normal?    [] Yes    [] No     Do you or your family have a history of Malignant Hyperthermia?   [] Yes    [x] No           Do you smoke? [x] Yes    [] No    vape  Please refrain from smoking on the day of surgery.      Patient instructed on: [x] NPO Status   [x] Meds to Take  [] Hold GLP-1 Receptor Agonist  [x] Ride Home  [x] No Jewelry/Contact Lenses/Nail Polish  [x] Prep/Lax/Clear Liquids    [] Chlorhexidene     DOS Patient Needs [] HCG   [x] Blood Sugar  [] PT/INR    [] T&S       Do you have any metal allergies? [] Yes    [x] No      If

## 2025-06-20 ENCOUNTER — ANESTHESIA EVENT (OUTPATIENT)
Dept: ENDOSCOPY | Age: 70
End: 2025-06-20
Payer: OTHER GOVERNMENT

## 2025-06-20 ENCOUNTER — PREP FOR PROCEDURE (OUTPATIENT)
Dept: FAMILY MEDICINE CLINIC | Age: 70
End: 2025-06-20

## 2025-06-20 RX ORDER — SODIUM CHLORIDE 9 MG/ML
25 INJECTION, SOLUTION INTRAVENOUS PRN
Status: CANCELLED | OUTPATIENT
Start: 2025-06-20

## 2025-06-20 RX ORDER — SODIUM CHLORIDE 0.9 % (FLUSH) 0.9 %
5-40 SYRINGE (ML) INJECTION EVERY 12 HOURS SCHEDULED
Status: CANCELLED | OUTPATIENT
Start: 2025-06-20

## 2025-06-20 RX ORDER — SODIUM CHLORIDE 0.9 % (FLUSH) 0.9 %
5-40 SYRINGE (ML) INJECTION PRN
Status: CANCELLED | OUTPATIENT
Start: 2025-06-20

## 2025-06-20 RX ORDER — SODIUM CHLORIDE, SODIUM LACTATE, POTASSIUM CHLORIDE, CALCIUM CHLORIDE 600; 310; 30; 20 MG/100ML; MG/100ML; MG/100ML; MG/100ML
INJECTION, SOLUTION INTRAVENOUS CONTINUOUS
Status: CANCELLED | OUTPATIENT
Start: 2025-06-20

## 2025-06-26 ENCOUNTER — HOSPITAL ENCOUNTER (OUTPATIENT)
Age: 70
Setting detail: OUTPATIENT SURGERY
Discharge: HOME | End: 2025-06-26
Attending: INTERNAL MEDICINE | Admitting: INTERNAL MEDICINE
Payer: OTHER GOVERNMENT

## 2025-06-26 ENCOUNTER — ANESTHESIA (OUTPATIENT)
Dept: ENDOSCOPY | Age: 70
End: 2025-06-26
Payer: OTHER GOVERNMENT

## 2025-06-26 VITALS
HEART RATE: 82 BPM | DIASTOLIC BLOOD PRESSURE: 90 MMHG | TEMPERATURE: 97.9 F | OXYGEN SATURATION: 99 % | SYSTOLIC BLOOD PRESSURE: 139 MMHG | RESPIRATION RATE: 19 BRPM | HEIGHT: 68 IN | WEIGHT: 225.3 LBS | BODY MASS INDEX: 34.14 KG/M2

## 2025-06-26 DIAGNOSIS — Z12.11 SCREENING FOR COLON CANCER: ICD-10-CM

## 2025-06-26 LAB — GLUCOSE BLD-MCNC: 127 MG/DL (ref 65–99)

## 2025-06-26 PROCEDURE — 2709999900 HC NON-CHARGEABLE SUPPLY: Performed by: INTERNAL MEDICINE

## 2025-06-26 PROCEDURE — 3609010600 HC COLONOSCOPY POLYPECTOMY SNARE/COLD BIOPSY: Performed by: INTERNAL MEDICINE

## 2025-06-26 PROCEDURE — 6370000000 HC RX 637 (ALT 250 FOR IP): Performed by: INTERNAL MEDICINE

## 2025-06-26 PROCEDURE — 3700000001 HC ADD 15 MINUTES (ANESTHESIA): Performed by: INTERNAL MEDICINE

## 2025-06-26 PROCEDURE — 3700000000 HC ANESTHESIA ATTENDED CARE: Performed by: INTERNAL MEDICINE

## 2025-06-26 PROCEDURE — 7100000011 HC PHASE II RECOVERY - ADDTL 15 MIN: Performed by: INTERNAL MEDICINE

## 2025-06-26 PROCEDURE — 2500000003 HC RX 250 WO HCPCS: Performed by: NURSE ANESTHETIST, CERTIFIED REGISTERED

## 2025-06-26 PROCEDURE — 82947 ASSAY GLUCOSE BLOOD QUANT: CPT

## 2025-06-26 PROCEDURE — 7100000010 HC PHASE II RECOVERY - FIRST 15 MIN: Performed by: INTERNAL MEDICINE

## 2025-06-26 PROCEDURE — 6360000002 HC RX W HCPCS: Performed by: NURSE ANESTHETIST, CERTIFIED REGISTERED

## 2025-06-26 PROCEDURE — 2580000003 HC RX 258: Performed by: INTERNAL MEDICINE

## 2025-06-26 PROCEDURE — 88305 TISSUE EXAM BY PATHOLOGIST: CPT

## 2025-06-26 RX ORDER — SIMETHICONE 40MG/0.6ML
SUSPENSION, DROPS(FINAL DOSAGE FORM)(ML) ORAL PRN
Status: DISCONTINUED | OUTPATIENT
Start: 2025-06-26 | End: 2025-06-26 | Stop reason: ALTCHOICE

## 2025-06-26 RX ORDER — SODIUM CHLORIDE, SODIUM LACTATE, POTASSIUM CHLORIDE, CALCIUM CHLORIDE 600; 310; 30; 20 MG/100ML; MG/100ML; MG/100ML; MG/100ML
INJECTION, SOLUTION INTRAVENOUS CONTINUOUS
Status: DISCONTINUED | OUTPATIENT
Start: 2025-06-26 | End: 2025-06-26 | Stop reason: HOSPADM

## 2025-06-26 RX ORDER — FENTANYL CITRATE 50 UG/ML
INJECTION, SOLUTION INTRAMUSCULAR; INTRAVENOUS
Status: DISCONTINUED | OUTPATIENT
Start: 2025-06-26 | End: 2025-06-26 | Stop reason: SDUPTHER

## 2025-06-26 RX ORDER — LIDOCAINE HYDROCHLORIDE 10 MG/ML
INJECTION, SOLUTION EPIDURAL; INFILTRATION; INTRACAUDAL; PERINEURAL
Status: DISCONTINUED | OUTPATIENT
Start: 2025-06-26 | End: 2025-06-26 | Stop reason: SDUPTHER

## 2025-06-26 RX ORDER — METOPROLOL TARTRATE 1 MG/ML
INJECTION, SOLUTION INTRAVENOUS
Status: DISCONTINUED | OUTPATIENT
Start: 2025-06-26 | End: 2025-06-26 | Stop reason: SDUPTHER

## 2025-06-26 RX ORDER — PROPOFOL 10 MG/ML
INJECTION, EMULSION INTRAVENOUS
Status: DISCONTINUED | OUTPATIENT
Start: 2025-06-26 | End: 2025-06-26 | Stop reason: SDUPTHER

## 2025-06-26 RX ORDER — SODIUM CHLORIDE 9 MG/ML
25 INJECTION, SOLUTION INTRAVENOUS PRN
Status: DISCONTINUED | OUTPATIENT
Start: 2025-06-26 | End: 2025-06-26 | Stop reason: HOSPADM

## 2025-06-26 RX ORDER — SODIUM CHLORIDE 0.9 % (FLUSH) 0.9 %
5-40 SYRINGE (ML) INJECTION EVERY 12 HOURS SCHEDULED
Status: DISCONTINUED | OUTPATIENT
Start: 2025-06-26 | End: 2025-06-26 | Stop reason: HOSPADM

## 2025-06-26 RX ORDER — MIDAZOLAM HYDROCHLORIDE 1 MG/ML
INJECTION, SOLUTION INTRAMUSCULAR; INTRAVENOUS
Status: DISCONTINUED | OUTPATIENT
Start: 2025-06-26 | End: 2025-06-26 | Stop reason: SDUPTHER

## 2025-06-26 RX ORDER — SODIUM CHLORIDE 0.9 % (FLUSH) 0.9 %
5-40 SYRINGE (ML) INJECTION PRN
Status: DISCONTINUED | OUTPATIENT
Start: 2025-06-26 | End: 2025-06-26 | Stop reason: HOSPADM

## 2025-06-26 RX ADMIN — PROPOFOL 30 MG: 10 INJECTION, EMULSION INTRAVENOUS at 07:15

## 2025-06-26 RX ADMIN — SODIUM CHLORIDE, SODIUM LACTATE, POTASSIUM CHLORIDE, AND CALCIUM CHLORIDE: .6; .31; .03; .02 INJECTION, SOLUTION INTRAVENOUS at 06:52

## 2025-06-26 RX ADMIN — LIDOCAINE HYDROCHLORIDE 50 MG: 10 INJECTION, SOLUTION EPIDURAL; INFILTRATION; INTRACAUDAL; PERINEURAL at 07:11

## 2025-06-26 RX ADMIN — PROPOFOL 80 MG: 10 INJECTION, EMULSION INTRAVENOUS at 07:11

## 2025-06-26 RX ADMIN — METOROPROLOL TARTRATE 3 MG: 5 INJECTION, SOLUTION INTRAVENOUS at 07:15

## 2025-06-26 RX ADMIN — MIDAZOLAM 2 MG: 1 INJECTION INTRAMUSCULAR; INTRAVENOUS at 07:09

## 2025-06-26 RX ADMIN — PROPOFOL 30 MG: 10 INJECTION, EMULSION INTRAVENOUS at 07:18

## 2025-06-26 RX ADMIN — FENTANYL CITRATE 50 MCG: 50 INJECTION, SOLUTION INTRAMUSCULAR; INTRAVENOUS at 07:11

## 2025-06-26 RX ADMIN — METOROPROLOL TARTRATE 2 MG: 5 INJECTION, SOLUTION INTRAVENOUS at 07:23

## 2025-06-26 RX ADMIN — FENTANYL CITRATE 50 MCG: 50 INJECTION, SOLUTION INTRAMUSCULAR; INTRAVENOUS at 07:09

## 2025-06-26 RX ADMIN — PROPOFOL 75 MCG/KG/MIN: 10 INJECTION, EMULSION INTRAVENOUS at 07:11

## 2025-06-26 ASSESSMENT — PAIN - FUNCTIONAL ASSESSMENT: PAIN_FUNCTIONAL_ASSESSMENT: 0-10

## 2025-06-26 ASSESSMENT — PAIN SCALES - GENERAL
PAINLEVEL_OUTOF10: 0
PAINLEVEL_OUTOF10: 0

## 2025-06-26 NOTE — PROCEDURES
[facility]                [address] [id number]                               COLONOSCOPY      PATIENT NAME: VAISHNAVI LOCKHART              : 1955  MED REC NO: [id number]                          ROOM: [facility]  ACCOUNT NO: [id number]                       ADMIT DATE: 2025  PROVIDER: Truman Fox MD      DATE OF PROCEDURE: 2025    SURGEON:  Truman Fox MD    PRIMARY CARE PHYSICIAN:  Truman Fox MD    PROCEDURES:    1. Video-assisted colonoscopy to the cecum.  2. Rectal polypectomy with cold cup biopsy forceps.  3. Sigmoid colon polypectomy with cold cup biopsy forceps.  4. Descending colon polypectomy x7 with cold cup biopsy forceps.  5. Proximal ascending colon polypectomy with cold cup biopsy forceps.    PREOPERATIVE DIAGNOSES:    1. Colon cancer screening.  2. History of adenomatous colon polyps.    POSTOPERATIVE DIAGNOSES:    1. Colon cancer screening.  2. Rectal polyp.  3. Sigmoid colon polyp.  4. Descending colon polyps x7.  5. Proximal ascending colon polyp.  6. External hemorrhoids.    ANESTHESIA:  Per Sascha Hooks CRNA, MAC anesthesia.    ASSISTANT:  Lanny Singh CST.    COMPLICATIONS:  None.    ESTIMATED BLOOD LOSS:  Less than 2 mL.    DESCRIPTION OF PROCEDURE:  The patient was brought to the operating room, where procedure was explained along with possible complications and informed consent was obtained.  He was then taken to the minor procedure room, where continuous cardiopulmonary monitoring was placed along with O2 via simple mask.  The patient was then placed flat in bed, positioned on his left side, made comfortable, and IV sedation was given per Sascha Hooks CRNA.  Once the patient was adequately sedated, a digital rectal exam was performed.  As noted the patient had external hemorrhoids.  No masses were palpated.  Prostate was just palpable at the tip of the finger so adequate assessment of the prostate was not made.

## 2025-06-26 NOTE — ANESTHESIA PRE PROCEDURE
Allergies:  No Known Allergies    Problem List:    Patient Active Problem List   Diagnosis Code    History of colon polyps Z86.0100    IFG (impaired fasting glucose) R73.01    Recurrent genital herpes A60.00    Uncontrolled type 2 diabetes mellitus with hyperglycemia (HCC) E11.65       Past Medical History:        Diagnosis Date    Hyperlipidemia     Primary hypertension     Uncontrolled type 2 diabetes mellitus with hyperglycemia (HCC) 2025       Past Surgical History:        Procedure Laterality Date    ACHILLES TENDON SURGERY Left     APPENDECTOMY      COLONOSCOPY  2012    4 adenomatous polyps    COLONOSCOPY  2016    1 tubular adenoma    COLONOSCOPY N/A 2022     Back:  6 adenomatous polyps    CORONARY ANGIOPLASTY WITH STENT PLACEMENT  03/10/2023    Dr. Meza @ [facility]--Stenting X 1--LAD    HERNIA REPAIR      TONSILLECTOMY      VASCULAR SURGERY         Social History:    Social History     Tobacco Use    Smoking status: Former     Current packs/day: 0.00     Average packs/day: 1 pack/day for 40.0 years (40.0 ttl pk-yrs)     Types: Cigarettes     Start date: 1983     Quit date: 2023     Years since quittin.4    Smokeless tobacco: Never   Substance Use Topics    Alcohol use: Yes     Alcohol/week: 12.0 standard drinks of alcohol     Types: 12 Cans of beer per week     Comment: per week                                Counseling given: Not Answered      Vital Signs (Current):   Vitals:    25 1324 25 0645 25 0648 25 0756   BP:   (!) 149/90 118/83   Pulse:   (!) 103 95   Resp:   22 24   Temp:   36.2 °C (97.1 °F) 36.6 °C (97.9 °F)   TempSrc:   Temporal Temporal   SpO2:   96% 94%   Weight: 102.1 kg (225 lb) 102.2 kg (225 lb 4.8 oz)     Height: 1.727 m (5' 8\") 1.722 m (5' 7.8\")                                                BP Readings from Last 3 Encounters:   25 118/83   25 124/74   25 126/74       NPO Status: Time of last liquid

## 2025-06-26 NOTE — BRIEF OP NOTE
Brief Postoperative Note      Patient: Manjeet Portillo  YOB: 1955  MRN: [id number]    Date of Procedure: 6/26/2025    Pre-Op Diagnosis Codes:      * Screening for colon cancer [Z12.11]  H/O Adenomatous colon polyps.    Post-Op Diagnosis:  Colon cancer screening.     Rectal polyp.     Sigmoid colon polyp.     Descending colon polyps x 7.     Proximal ascending colon polyp.     External hemorrhoids.        Procedure(s):  Colonoscopy to the Cecum.  Rectal polypectomy with cold cup biopsy forceps.  Sigmoid polypectomy with cold cup biopsy forceps.  Descending colon polypectomy x 7 with cold cup biopsy forceps.  Proximal ascending colon polypectomy with cold cup biopsy forceps.     Surgeon(s):  Truman Fxo MD    Assistant:  Lanny Singh CST    Anesthesia: Je Peterson CRNA.  MAC anesthsia.     Estimated Blood Loss (mL): < 2 ml    Complications: None    Specimens:   ID Type Source Tests Collected by Time Destination   A : A. Proximal ascending polyp Tissue Colon-Ascending SURGICAL PATHOLOGY Truman Fox MD 6/26/2025 0725    B : B. descending colon polyps x 4 Tissue Colon-Descending SURGICAL PATHOLOGY Truman Fox MD 6/26/2025 0732    C : C. Descending colon polyp Tissue Colon-Descending SURGICAL PATHOLOGY Truman oFx MD 6/26/2025 0738    D : D. descending colon polyp Tissue Colon-Descending SURGICAL PATHOLOGY Truman Fox MD 6/26/2025 0740    E : E. Descending colon polyp Tissue Colon-Descending SURGICAL PATHOLOGY Truman Fox MD 6/26/2025 0743    F : F. Sigmoid polyp Tissue Sigmoid Colon SURGICAL PATHOLOGY Trmuan Fox MD 6/26/2025 0747    G : G. Rectal polyp Tissue Rectum SURGICAL PATHOLOGY Truman Fox MD 6/26/2025 0750        Implants:  * No implants in log *      Drains: * No LDAs found *      Electronically signed by Truman Fox MD on 6/26/2025 at 7:52 AM

## 2025-06-26 NOTE — ANESTHESIA POSTPROCEDURE EVALUATION
Department of Anesthesiology  Postprocedure Note    Patient: Manjeet Portillo  MRN: [id number]  YOB: 1955  Date of evaluation: 6/26/2025    Procedure Summary       Date: 06/26/25 Room / Location: [facility] ENDO 01A / [facility] ENDOSCOPY    Anesthesia Start: 0709 Anesthesia Stop: 0755    Procedure: COLONOSCOPY (Abdomen) Diagnosis:       Screening for colon cancer      (Screening for colon cancer [Z12.11])    Surgeons: Truman Fox MD Responsible Provider: Je Peterson APRN - CRNA    Anesthesia Type: MAC ASA Status: 3            Anesthesia Type: No value filed.    Antony Phase I: Antony Score: 10    Antony Phase II:      Anesthesia Post Evaluation    Patient location during evaluation: PACU  Patient participation: complete - patient participated  Level of consciousness: awake and lethargic  Pain score: 0  Airway patency: patent  Nausea & Vomiting: no nausea and no vomiting  Cardiovascular status: hemodynamically stable  Respiratory status: acceptable, nonlabored ventilation, room air and spontaneous ventilation  Hydration status: euvolemic  Multimodal analgesia pain management approach  Pain management: adequate and satisfactory to patient    No notable events documented.

## 2025-06-26 NOTE — DISCHARGE INSTRUCTIONS
Discharge Instructions    Admission Date:  6/26/2025  Discharge Date:  6/26/25    Disposition:  Home    Activity:  As tolerated    Diet:  Cardiac    Discharge Instructions:  Resume previous home medication but start back on Eliquis tomorrow.     Follow Up:  With your primary care physician (Dr. Fox) in 2 weeks.    Discharge Instructions for Colonoscopy    Do not drive or operate machinery for 24 hours.  Do not make important personal or business decisions for 24 hours.   Do not drink alcoholic beverages for 24 hours.  Do not smoke tobacco products for 24 hours.  It is normal to have a feeling of fullness or mild cramping in your abdomen afterwards due to air that is put into your bowel during the procedure. Mild activities such as walking will help you pass the air.  You may resume your regular diet.   Results from a biopsy may take up to 2 weeks to return from pathology.  Make a follow-up appointment with PCP to be seen in 2 weeks.     SEEK MEDICAL ATTENTION IF:    Chest Pain or trouble breathing.    Persistent and significant bleeding from your rectum, such as soaking through clothing and/or feeling dizzy and sweating.    A fever above 101F or if you have chills.    If symptoms are severe call 911 or go to the nearest Emergency Room.

## 2025-06-27 LAB — SURGICAL PATHOLOGY REPORT: NORMAL

## 2025-07-11 ENCOUNTER — APPOINTMENT (OUTPATIENT)
Dept: GENERAL RADIOLOGY | Age: 70
End: 2025-07-11
Payer: OTHER GOVERNMENT

## 2025-07-11 ENCOUNTER — HOSPITAL ENCOUNTER (EMERGENCY)
Age: 70
Discharge: HOME | End: 2025-07-11
Attending: EMERGENCY MEDICINE
Payer: OTHER GOVERNMENT

## 2025-07-11 VITALS
WEIGHT: 225 LBS | TEMPERATURE: 98.2 F | DIASTOLIC BLOOD PRESSURE: 58 MMHG | SYSTOLIC BLOOD PRESSURE: 113 MMHG | RESPIRATION RATE: 19 BRPM | HEIGHT: 68 IN | BODY MASS INDEX: 34.1 KG/M2 | OXYGEN SATURATION: 96 % | HEART RATE: 80 BPM

## 2025-07-11 DIAGNOSIS — R06.00 DYSPNEA, UNSPECIFIED TYPE: ICD-10-CM

## 2025-07-11 DIAGNOSIS — I48.4 ATYPICAL ATRIAL FLUTTER (HCC): Primary | ICD-10-CM

## 2025-07-11 LAB
ANION GAP SERPL CALCULATED.3IONS-SCNC: 11 MMOL/L (ref 9–17)
BASOPHILS # BLD: 0.02 K/UL (ref 0–0.2)
BASOPHILS NFR BLD: 0 % (ref 0–2)
BUN SERPL-MCNC: 21 MG/DL (ref 8–23)
CALCIUM SERPL-MCNC: 8.9 MG/DL (ref 8.6–10.4)
CHLORIDE SERPL-SCNC: 102 MMOL/L (ref 98–107)
CO2 SERPL-SCNC: 23 MMOL/L (ref 20–31)
CREAT SERPL-MCNC: 1.7 MG/DL (ref 0.7–1.2)
EKG ATRIAL RATE: 277 BPM
EKG P AXIS: -81 DEGREES
EKG Q-T INTERVAL: 322 MS
EKG QRS DURATION: 120 MS
EKG QTC CALCULATION (BAZETT): 406 MS
EKG R AXIS: 98 DEGREES
EKG T AXIS: 79 DEGREES
EKG VENTRICULAR RATE: 96 BPM
EOSINOPHIL # BLD: 0.03 K/UL (ref 0–0.4)
EOSINOPHILS RELATIVE PERCENT: 0 % (ref 0–5)
ERYTHROCYTE [DISTWIDTH] IN BLOOD BY AUTOMATED COUNT: 12.5 % (ref 12.1–15.2)
GFR, ESTIMATED: 43 ML/MIN/1.73M2
GLUCOSE SERPL-MCNC: 169 MG/DL (ref 70–99)
HCT VFR BLD AUTO: 47.3 % (ref 41–53)
HGB BLD-MCNC: 16.3 G/DL (ref 13.5–17.5)
IMM GRANULOCYTES # BLD AUTO: 0.01 K/UL (ref 0–0.3)
IMM GRANULOCYTES NFR BLD: 0 % (ref 0–5)
LYMPHOCYTES NFR BLD: 1.85 K/UL (ref 1–4.8)
LYMPHOCYTES RELATIVE PERCENT: 20 % (ref 13–44)
MCH RBC QN AUTO: 30.5 PG (ref 26–34)
MCHC RBC AUTO-ENTMCNC: 34.5 G/DL (ref 31–37)
MCV RBC AUTO: 88.4 FL (ref 80–100)
MONOCYTES NFR BLD: 0.63 K/UL (ref 0–1)
MONOCYTES NFR BLD: 7 % (ref 5–9)
NEUTROPHILS NFR BLD: 73 % (ref 39–75)
NEUTS SEG NFR BLD: 6.56 K/UL (ref 2.1–6.5)
PLATELET # BLD AUTO: 273 K/UL (ref 140–450)
PMV BLD AUTO: 9.1 FL (ref 6–12)
POTASSIUM SERPL-SCNC: 4.8 MMOL/L (ref 3.7–5.3)
RBC # BLD AUTO: 5.35 M/UL (ref 4.5–5.9)
SODIUM SERPL-SCNC: 136 MMOL/L (ref 135–144)
TROPONIN I SERPL HS-MCNC: 21 NG/L (ref 0–22)
WBC OTHER # BLD: 9.1 K/UL (ref 3.5–11)

## 2025-07-11 PROCEDURE — 80048 BASIC METABOLIC PNL TOTAL CA: CPT

## 2025-07-11 PROCEDURE — 85025 COMPLETE CBC W/AUTO DIFF WBC: CPT

## 2025-07-11 PROCEDURE — 93005 ELECTROCARDIOGRAM TRACING: CPT | Performed by: EMERGENCY MEDICINE

## 2025-07-11 PROCEDURE — 99285 EMERGENCY DEPT VISIT HI MDM: CPT

## 2025-07-11 PROCEDURE — 71045 X-RAY EXAM CHEST 1 VIEW: CPT

## 2025-07-11 PROCEDURE — 84484 ASSAY OF TROPONIN QUANT: CPT

## 2025-07-11 RX ORDER — LISINOPRIL 5 MG/1
5 TABLET ORAL DAILY
COMMUNITY

## 2025-07-11 RX ORDER — CLOPIDOGREL BISULFATE 75 MG/1
75 TABLET ORAL DAILY
COMMUNITY

## 2025-07-11 ASSESSMENT — PAIN SCALES - GENERAL
PAINLEVEL_OUTOF10: 0
PAINLEVEL_OUTOF10: 0

## 2025-07-11 ASSESSMENT — LIFESTYLE VARIABLES
HOW OFTEN DO YOU HAVE A DRINK CONTAINING ALCOHOL: NEVER
HOW MANY STANDARD DRINKS CONTAINING ALCOHOL DO YOU HAVE ON A TYPICAL DAY: PATIENT DOES NOT DRINK

## 2025-07-11 ASSESSMENT — PAIN - FUNCTIONAL ASSESSMENT: PAIN_FUNCTIONAL_ASSESSMENT: 0-10

## 2025-07-11 NOTE — ED PROVIDER NOTES
EMERGENCY DEPARTMENT ENCOUNTER      CHIEF COMPLAINT    Chief Complaint   Patient presents with    Atrial Fibrillation     Patient states he thinks he is in Afib, has been feeling SOB and dizzy for past 2 days.       NATALY Portillo is a 70 y.o. male who presentsto ED with shortness of breath for the past 2 days.  Patient has history of atrial fibrillation and atrial flutter.  Denies chest pain.  PAST MEDICAL HISTORY    Past Medical History:   Diagnosis Date    Hyperlipidemia     Primary hypertension     Uncontrolled type 2 diabetes mellitus with hyperglycemia (HCC) 04/11/2025       SURGICAL HISTORY    Past Surgical History:   Procedure Laterality Date    ACHILLES TENDON SURGERY Left     APPENDECTOMY      COLONOSCOPY  09/13/2012    4 adenomatous polyps    COLONOSCOPY  07/19/2016    1 tubular adenoma    COLONOSCOPY N/A 06/24/2022     Back:  6 adenomatous polyps    COLONOSCOPY N/A 06/26/2025     Back:  4 adenomatous polyps    CORONARY ANGIOPLASTY WITH STENT PLACEMENT  03/10/2023    Dr. Meza @ [facility]--Stenting X 1--LAD    HERNIA REPAIR      TONSILLECTOMY      VASCULAR SURGERY         CURRENT MEDICATIONS    Current Outpatient Rx   Medication Sig Dispense Refill    clopidogrel (PLAVIX) 75 MG tablet Take 1 tablet by mouth daily      lisinopril (PRINIVIL;ZESTRIL) 5 MG tablet Take 1 tablet by mouth daily      spironolactone (ALDACTONE) 25 MG tablet Take 1 tablet by mouth daily      dilTIAZem (CARDIZEM CD) 120 MG extended release capsule Take 1 capsule by mouth 2 times daily 60 capsule 3    rosuvastatin (CRESTOR) 40 MG tablet Take 1 tablet by mouth every evening      metoprolol succinate (TOPROL XL) 25 MG extended release tablet Take 1 tablet by mouth daily      apixaban (ELIQUIS) 5 MG TABS tablet Take by mouth 2 times daily       furosemide (LASIX) 20 MG tablet Take 1 tablet by mouth daily      Cholecalciferol (VITAMIN D PO) Take 2,000 Units by mouth       valACYclovir (VALTREX) 500 MG tablet Take 1  CHEST PORTABLE   Final Result   FINDINGS/IMPRESSION:      1. Normal sized heart.      2. Clear lungs.              PROCEDURES    Procedures    Labs  Labs Reviewed   CBC WITH AUTO DIFFERENTIAL - Abnormal; Notable for the following components:       Result Value    Neutrophils Absolute 6.56 (*)     All other components within normal limits   BASIC METABOLIC PANEL - Abnormal; Notable for the following components:    Glucose 169 (*)     Creatinine 1.7 (*)     Est, Glom Filt Rate 43 (*)     All other components within normal limits   TROPONIN       MIPS  Not applicable      Total Critical Care time was:    EMERGENCY DEPARTMENT COURSE and DIFFERENTIAL DIAGNOSIS/MDM:    Patient Course: 70-year-old male that presents to ED with shortness of breath.  Patient is in atrial flutter.  Patient states his symptoms started couple days ago  Patient states that his heart rate has been very fast.  In ED patient's heart rate is in the 70s and 80s.  Patient is in atrial flutter, rate controlled.    Patient is discussed with .  Patient will be discharged home.  Follow-up with cardiologist next week.  The warning signs were discussed.  For any worsening symptoms return to ED  Number and complexity of problems:    Differential Diagnosis: Coronary to disease, MI, CHF    Pertinent Comorbid Conditions: History of atrial flutter    2)  Data Reviewed    Decision Rules/Scores utilized:      Labs/tests: Reviewed    EKG/rhythm strips: Reviewed    Radiology interpretation/review: Reviewed          3)  Treatment and Disposition    Patient repeat assessment: On repeat assessment patient is with stable vital signs not short of breath in ED    Disposition discussion with patient/family: Patient will be discharged home    Shared Decision Making: The plan of care is discussed with the patient.    Case discussed with consulting clinician:  Susana.    ED Medications administered this visit:  Medications - No data to display    New Prescriptions

## 2025-07-11 NOTE — ED TRIAGE NOTES
Patient presented to the ED with complaints of shortness of breath and reported, \"I think I’m in AFib.\" Patient states he has been monitoring his blood pressure and heart rate at home and reports BP readings of 70/60 and 100/60 over the past two days, with HR in the 130s. He reports feeling short of breath for the last two days, with no improvement.  Patient ambulated independently to the assigned room with a steady gait and required no assistance. Denies chest pain at this time.  Home medication list was reviewed and verified both verbally with the patient and with the list stored on his phone. Patient is alert and oriented ×4, answers questions appropriately, and is a reliable historian.  Patient placed on cardiac monitor upon arrival. Vital signs stable at this time. Will continue to monitor.

## 2025-07-14 LAB
EKG ATRIAL RATE: 277 BPM
EKG P AXIS: -81 DEGREES
EKG Q-T INTERVAL: 322 MS
EKG QRS DURATION: 120 MS
EKG QTC CALCULATION (BAZETT): 406 MS
EKG R AXIS: 98 DEGREES
EKG T AXIS: 79 DEGREES
EKG VENTRICULAR RATE: 96 BPM

## 2025-07-16 ENCOUNTER — OFFICE VISIT (OUTPATIENT)
Dept: CARDIOLOGY CLINIC | Age: 70
End: 2025-07-16
Payer: OTHER GOVERNMENT

## 2025-07-16 VITALS
SYSTOLIC BLOOD PRESSURE: 106 MMHG | HEART RATE: 88 BPM | BODY MASS INDEX: 34.57 KG/M2 | WEIGHT: 226 LBS | RESPIRATION RATE: 16 BRPM | OXYGEN SATURATION: 94 % | DIASTOLIC BLOOD PRESSURE: 64 MMHG

## 2025-07-16 DIAGNOSIS — I48.92 ATRIAL FLUTTER, UNSPECIFIED TYPE (HCC): Primary | ICD-10-CM

## 2025-07-16 PROCEDURE — 99214 OFFICE O/P EST MOD 30 MIN: CPT | Performed by: NURSE PRACTITIONER

## 2025-07-16 PROCEDURE — 1123F ACP DISCUSS/DSCN MKR DOCD: CPT | Performed by: NURSE PRACTITIONER

## 2025-07-16 RX ORDER — DILTIAZEM HYDROCHLORIDE 120 MG/1
CAPSULE, COATED, EXTENDED RELEASE ORAL
Qty: 90 CAPSULE | Refills: 3 | Status: SHIPPED | OUTPATIENT
Start: 2025-07-16

## 2025-07-16 NOTE — PROGRESS NOTES
Pt in office for an ED f/u with Estela Sanchez.     Has dizziness every time he best over last week. No chest pain. Has a lot of sob.

## 2025-07-16 NOTE — PATIENT INSTRUCTIONS
Will increase diltizem  mg, 2 capsules in AM and 1 capsule in PM.  Will check BP and HR once daily, different times of day, record and call office with readings in 1 week.  Notify office if less than 100 systolic - top number for BP.  Will do EP referral to [facility].  Will follow up in 2 weeks.

## 2025-07-17 ENCOUNTER — OFFICE VISIT (OUTPATIENT)
Dept: FAMILY MEDICINE CLINIC | Age: 70
End: 2025-07-17
Payer: OTHER GOVERNMENT

## 2025-07-17 VITALS
OXYGEN SATURATION: 98 % | WEIGHT: 228 LBS | HEART RATE: 81 BPM | BODY MASS INDEX: 34.87 KG/M2 | SYSTOLIC BLOOD PRESSURE: 120 MMHG | DIASTOLIC BLOOD PRESSURE: 62 MMHG

## 2025-07-17 DIAGNOSIS — D36.9 ADENOMATOUS POLYPS: Primary | ICD-10-CM

## 2025-07-17 PROCEDURE — 1123F ACP DISCUSS/DSCN MKR DOCD: CPT | Performed by: INTERNAL MEDICINE

## 2025-07-17 PROCEDURE — 99213 OFFICE O/P EST LOW 20 MIN: CPT | Performed by: INTERNAL MEDICINE

## 2025-07-17 ASSESSMENT — ENCOUNTER SYMPTOMS
DIARRHEA: 0
SORE THROAT: 0
CONSTIPATION: 0
ABDOMINAL PAIN: 0
BLOOD IN STOOL: 0
RESPIRATORY NEGATIVE: 1
NAUSEA: 0

## 2025-07-17 NOTE — PATIENT INSTRUCTIONS
Assessment & Plan  Adenomatous polyps   Based on the current guidelines for Colonoscopy, a repeat colonoscopy is recommended in 3  years.     [place] multi-society task force recommendations for post-colonoscopy follow-up in average-risk adults with normal colonoscopy or adenomas*  Baseline colonoscopy finding Recommended interval for surveillance colonoscopy Strength of recommendation Quality of evidence   Normal 10 years¶ Strong High   1 to 2 tubular adenomas <10 mm 7 to 10 years? Strong Moderate   3 to 4 tubular adenomas <10 mm 3 to 5 years Weak Very low   5 to 10 tubular adenomas <10 mm 3 years Strong Moderate   Adenoma >=10 mm 3 years Strong High   Adenoma with tubulovillous or villous histology 3 years? Strong Moderate   Adenoma with high-grade dysplasia 3 years? Strong Moderate   >10 adenomas on single examination§ 1 year Weak Very low   Piecemeal resection of adenoma >=20 mm 6 months Strong Moderate¥   CRC: colorectal cancer.  * All recommendations assume examination complete to cecum with bowel preparation adequate to detect lesions >5 mm in size; recommendations do not apply to individuals with a hereditary CRC syndrome, personal history of inflammatory bowel disease, personal history of hereditary cancer syndrome, serrated polyposis syndrome, malignant polyp, personal history of CRC, or family history of CRC, and must be judiciously applied to such individuals, favoring the shortest indicated interval based on either history or polyp findings.  ¶ Follow-up may be with colonoscopy or other screening modality for average-risk individuals.  ? Patients with recommendations issued before 2020 for shorter than 7- to 10-year follow-up after diagnosis of 1 to 2 tubular adenomas may follow original recommendations. If feasible, physicians may re-evaluate patients previously recommended an interval shorter than 10 years and reasonably choose to provide an updated recommendation for 7- to 10-year follow-up, taking into

## 2025-07-17 NOTE — PROGRESS NOTES
Manjeet Portillo (:  1955) is a 70 y.o. male,Established patient, here for evaluation of the following chief complaint(s):  Follow Up After Procedure (Colonoscopy  )         Assessment & Plan  Adenomatous polyps   Based on the current guidelines for Colonoscopy, a repeat colonoscopy is recommended in 3  years.     [place] multi-society task force recommendations for post-colonoscopy follow-up in average-risk adults with normal colonoscopy or adenomas*  Baseline colonoscopy finding Recommended interval for surveillance colonoscopy Strength of recommendation Quality of evidence   Normal 10 years¶ Strong High   1 to 2 tubular adenomas <10 mm 7 to 10 years? Strong Moderate   3 to 4 tubular adenomas <10 mm 3 to 5 years Weak Very low   5 to 10 tubular adenomas <10 mm 3 years Strong Moderate   Adenoma >=10 mm 3 years Strong High   Adenoma with tubulovillous or villous histology 3 years? Strong Moderate   Adenoma with high-grade dysplasia 3 years? Strong Moderate   >10 adenomas on single examination§ 1 year Weak Very low   Piecemeal resection of adenoma >=20 mm 6 months Strong Moderate¥   CRC: colorectal cancer.  * All recommendations assume examination complete to cecum with bowel preparation adequate to detect lesions >5 mm in size; recommendations do not apply to individuals with a hereditary CRC syndrome, personal history of inflammatory bowel disease, personal history of hereditary cancer syndrome, serrated polyposis syndrome, malignant polyp, personal history of CRC, or family history of CRC, and must be judiciously applied to such individuals, favoring the shortest indicated interval based on either history or polyp findings.  ¶ Follow-up may be with colonoscopy or other screening modality for average-risk individuals.  ? Patients with recommendations issued before  for shorter than 7- to 10-year follow-up after diagnosis of 1 to 2 tubular adenomas may follow original recommendations. If feasible,

## 2025-07-30 ENCOUNTER — TELEPHONE (OUTPATIENT)
Dept: CARDIOLOGY CLINIC | Age: 70
End: 2025-07-30

## 2025-07-30 DIAGNOSIS — I25.10 CORONARY ARTERY DISEASE DUE TO LIPID RICH PLAQUE: ICD-10-CM

## 2025-07-30 DIAGNOSIS — I48.92 ATRIAL FLUTTER, UNSPECIFIED TYPE (HCC): Primary | ICD-10-CM

## 2025-07-30 DIAGNOSIS — Z98.61 POST PTCA: ICD-10-CM

## 2025-07-30 DIAGNOSIS — I25.83 CORONARY ARTERY DISEASE DUE TO LIPID RICH PLAQUE: ICD-10-CM

## 2025-07-30 RX ORDER — AMIODARONE HYDROCHLORIDE 200 MG/1
200 TABLET ORAL DAILY
Qty: 90 TABLET | Refills: 1 | Status: SHIPPED | OUTPATIENT
Start: 2025-07-30

## 2025-07-30 NOTE — TELEPHONE ENCOUNTER
Manjeet CLAYTON Bandar     1955        male    [address] [address]         [id number]           Legal Guardian   If yes, Name:       Skilled Facility no    If yes, Name:                                             Home Phone: [phone]         Cell Phone:    Telephone Information:   Mobile [phone]                                           Surgeon: dr Levin Surgery Date: 8/2725                    Time:     Procedure: cardioversion  Duration:    Diagnosis: a fib     CPT Codes:I48.91    Important Medical History:  In Epic    First Assistant   Special Inst/Equip/Implants:     Nickel allergy    Latex Allergy:       Cardiac Device:  No  If yes, need most recent pacemaker interrogation from Cardiologist:  Type of pacemaker:    Anesthesia:    MAC                       Admission Type:  Same Day                        Admit Prior to Day of Surgery: No    Case Location:  Ambulatory            Preadmission Testing:  Phone Call             PAT Date and Time:     Need Preop Cardiac Clearance:   Need Pre-op/Medical Clearance:    Does Patient have Cardiologist/physician? Name of Physician:        Special Needs Communication:  Ciara Lift   needed

## 2025-07-30 NOTE — TELEPHONE ENCOUNTER
Notified that reviewed BP/HR log. Will plan cardioversion for August 27, 2025 at 9 AM. Will start amiodarone 200 mg, 1 tablet daily and continue Cardizem. Reports that he is feeling a lot better with increase in Cardizem dose. Will order labs and EKG to be done prior to cardioversion.  Will continue to monitor BP and HR and keep office updated of any changes.

## 2025-08-06 ENCOUNTER — TELEPHONE (OUTPATIENT)
Dept: CARDIOLOGY CLINIC | Age: 70
End: 2025-08-06

## 2025-08-07 DIAGNOSIS — I48.92 ATRIAL FLUTTER, UNSPECIFIED TYPE (HCC): ICD-10-CM

## 2025-08-07 RX ORDER — AMIODARONE HYDROCHLORIDE 200 MG/1
200 TABLET ORAL DAILY
Qty: 90 TABLET | Refills: 3 | Status: SHIPPED | OUTPATIENT
Start: 2025-08-07

## 2025-08-07 RX ORDER — DILTIAZEM HYDROCHLORIDE 120 MG/1
CAPSULE, COATED, EXTENDED RELEASE ORAL
Qty: 90 CAPSULE | Refills: 3 | Status: SHIPPED | OUTPATIENT
Start: 2025-08-07

## 2025-08-12 ENCOUNTER — HOSPITAL ENCOUNTER (OUTPATIENT)
Dept: NON INVASIVE DIAGNOSTICS | Age: 70
Discharge: HOME | End: 2025-08-12
Payer: OTHER GOVERNMENT

## 2025-08-12 ENCOUNTER — HOSPITAL ENCOUNTER (OUTPATIENT)
Dept: LAB | Age: 70
Discharge: HOME | End: 2025-08-12
Payer: OTHER GOVERNMENT

## 2025-08-12 DIAGNOSIS — I25.83 CORONARY ARTERY DISEASE DUE TO LIPID RICH PLAQUE: ICD-10-CM

## 2025-08-12 DIAGNOSIS — I48.92 ATRIAL FLUTTER, UNSPECIFIED TYPE (HCC): ICD-10-CM

## 2025-08-12 DIAGNOSIS — I25.10 CORONARY ARTERY DISEASE DUE TO LIPID RICH PLAQUE: ICD-10-CM

## 2025-08-12 DIAGNOSIS — Z98.61 POST PTCA: ICD-10-CM

## 2025-08-12 LAB
ALBUMIN SERPL-MCNC: 4.1 G/DL (ref 3.5–5.2)
ALBUMIN/GLOB SERPL: 1.5 {RATIO} (ref 1–2.5)
ALP SERPL-CCNC: 54 U/L (ref 40–129)
ALT SERPL-CCNC: 27 U/L (ref 5–41)
ANION GAP SERPL CALCULATED.3IONS-SCNC: 12 MMOL/L (ref 9–17)
AST SERPL-CCNC: 22 U/L
BASOPHILS # BLD: 0.03 K/UL (ref 0–0.2)
BASOPHILS NFR BLD: 0 % (ref 0–2)
BILIRUB SERPL-MCNC: 0.8 MG/DL (ref 0.3–1.2)
BUN SERPL-MCNC: 18 MG/DL (ref 8–23)
CALCIUM SERPL-MCNC: 8.6 MG/DL (ref 8.6–10.4)
CHLORIDE SERPL-SCNC: 103 MMOL/L (ref 98–107)
CO2 SERPL-SCNC: 22 MMOL/L (ref 20–31)
CREAT SERPL-MCNC: 1.1 MG/DL (ref 0.7–1.2)
EOSINOPHIL # BLD: 0.06 K/UL (ref 0–0.4)
EOSINOPHILS RELATIVE PERCENT: 1 % (ref 0–5)
ERYTHROCYTE [DISTWIDTH] IN BLOOD BY AUTOMATED COUNT: 12.5 % (ref 12.1–15.2)
GFR, ESTIMATED: 72 ML/MIN/1.73M2
GLUCOSE SERPL-MCNC: 153 MG/DL (ref 70–99)
HCT VFR BLD AUTO: 49.5 % (ref 41–53)
HGB BLD-MCNC: 16.7 G/DL (ref 13.5–17.5)
IMM GRANULOCYTES # BLD AUTO: 0.01 K/UL (ref 0–0.3)
IMM GRANULOCYTES NFR BLD: 0 % (ref 0–5)
LYMPHOCYTES NFR BLD: 1.6 K/UL (ref 1–4.8)
LYMPHOCYTES RELATIVE PERCENT: 21 % (ref 13–44)
MCH RBC QN AUTO: 30.7 PG (ref 26–34)
MCHC RBC AUTO-ENTMCNC: 33.7 G/DL (ref 31–37)
MCV RBC AUTO: 91 FL (ref 80–100)
MONOCYTES NFR BLD: 0.65 K/UL (ref 0–1)
MONOCYTES NFR BLD: 8 % (ref 5–9)
NEUTROPHILS NFR BLD: 70 % (ref 39–75)
NEUTS SEG NFR BLD: 5.41 K/UL (ref 2.1–6.5)
PLATELET # BLD AUTO: 253 K/UL (ref 140–450)
PMV BLD AUTO: 9.2 FL (ref 6–12)
POTASSIUM SERPL-SCNC: 4.7 MMOL/L (ref 3.7–5.3)
PROT SERPL-MCNC: 6.8 G/DL (ref 6.4–8.3)
RBC # BLD AUTO: 5.44 M/UL (ref 4.5–5.9)
SODIUM SERPL-SCNC: 137 MMOL/L (ref 135–144)
TSH SERPL DL<=0.05 MIU/L-ACNC: 4.27 UIU/ML (ref 0.27–4.2)
WBC OTHER # BLD: 7.8 K/UL (ref 3.5–11)

## 2025-08-12 PROCEDURE — 80053 COMPREHEN METABOLIC PANEL: CPT

## 2025-08-12 PROCEDURE — 36415 COLL VENOUS BLD VENIPUNCTURE: CPT

## 2025-08-12 PROCEDURE — 93005 ELECTROCARDIOGRAM TRACING: CPT

## 2025-08-12 PROCEDURE — 85025 COMPLETE CBC W/AUTO DIFF WBC: CPT

## 2025-08-12 PROCEDURE — 84443 ASSAY THYROID STIM HORMONE: CPT

## 2025-08-13 LAB
EKG ATRIAL RATE: 238 BPM
EKG P AXIS: -32 DEGREES
EKG Q-T INTERVAL: 398 MS
EKG QRS DURATION: 106 MS
EKG QTC CALCULATION (BAZETT): 417 MS
EKG R AXIS: 88 DEGREES
EKG T AXIS: 66 DEGREES
EKG VENTRICULAR RATE: 66 BPM

## 2025-08-25 ENCOUNTER — ANESTHESIA EVENT (OUTPATIENT)
Dept: OTHER | Age: 70
End: 2025-08-25
Payer: OTHER GOVERNMENT

## 2025-08-27 ENCOUNTER — HOSPITAL ENCOUNTER (OUTPATIENT)
Dept: OTHER | Age: 70
Discharge: HOME | End: 2025-08-27
Payer: OTHER GOVERNMENT

## 2025-08-27 ENCOUNTER — ANESTHESIA (OUTPATIENT)
Dept: OTHER | Age: 70
End: 2025-08-27
Payer: OTHER GOVERNMENT

## 2025-08-27 VITALS
SYSTOLIC BLOOD PRESSURE: 128 MMHG | DIASTOLIC BLOOD PRESSURE: 77 MMHG | RESPIRATION RATE: 19 BRPM | TEMPERATURE: 97.6 F | WEIGHT: 230.5 LBS | BODY MASS INDEX: 34.14 KG/M2 | OXYGEN SATURATION: 96 % | HEIGHT: 69 IN | HEART RATE: 72 BPM

## 2025-08-27 PROBLEM — I48.3 TYPICAL ATRIAL FLUTTER (HCC): Status: ACTIVE | Noted: 2025-08-27

## 2025-08-27 LAB
EKG ATRIAL RATE: 227 BPM
EKG ATRIAL RATE: 70 BPM
EKG P AXIS: -27 DEGREES
EKG P AXIS: 63 DEGREES
EKG P-R INTERVAL: 186 MS
EKG Q-T INTERVAL: 362 MS
EKG Q-T INTERVAL: 406 MS
EKG QRS DURATION: 108 MS
EKG QRS DURATION: 92 MS
EKG QTC CALCULATION (BAZETT): 387 MS
EKG QTC CALCULATION (BAZETT): 438 MS
EKG R AXIS: 85 DEGREES
EKG R AXIS: 86 DEGREES
EKG T AXIS: 62 DEGREES
EKG T AXIS: 65 DEGREES
EKG VENTRICULAR RATE: 69 BPM
EKG VENTRICULAR RATE: 70 BPM

## 2025-08-27 PROCEDURE — 2500000003 HC RX 250 WO HCPCS: Performed by: NURSE ANESTHETIST, CERTIFIED REGISTERED

## 2025-08-27 PROCEDURE — 3700000000 HC ANESTHESIA ATTENDED CARE: Performed by: NURSE ANESTHETIST, CERTIFIED REGISTERED

## 2025-08-27 PROCEDURE — 93005 ELECTROCARDIOGRAM TRACING: CPT | Performed by: INTERNAL MEDICINE

## 2025-08-27 PROCEDURE — 3700000001 HC ADD 15 MINUTES (ANESTHESIA): Performed by: NURSE ANESTHETIST, CERTIFIED REGISTERED

## 2025-08-27 PROCEDURE — 2580000003 HC RX 258: Performed by: NURSE ANESTHETIST, CERTIFIED REGISTERED

## 2025-08-27 PROCEDURE — 7100000010 HC PHASE II RECOVERY - FIRST 15 MIN: Performed by: NURSE ANESTHETIST, CERTIFIED REGISTERED

## 2025-08-27 PROCEDURE — 92960 CARDIOVERSION ELECTRIC EXT: CPT | Performed by: NURSE ANESTHETIST, CERTIFIED REGISTERED

## 2025-08-27 PROCEDURE — 7100000011 HC PHASE II RECOVERY - ADDTL 15 MIN: Performed by: NURSE ANESTHETIST, CERTIFIED REGISTERED

## 2025-08-27 PROCEDURE — 2580000003 HC RX 258: Performed by: INTERNAL MEDICINE

## 2025-08-27 RX ORDER — SODIUM CHLORIDE, SODIUM LACTATE, POTASSIUM CHLORIDE, CALCIUM CHLORIDE 600; 310; 30; 20 MG/100ML; MG/100ML; MG/100ML; MG/100ML
INJECTION, SOLUTION INTRAVENOUS CONTINUOUS
Status: DISCONTINUED | OUTPATIENT
Start: 2025-08-27 | End: 2025-08-28 | Stop reason: HOSPADM

## 2025-08-27 RX ORDER — SODIUM CHLORIDE, SODIUM LACTATE, POTASSIUM CHLORIDE, CALCIUM CHLORIDE 600; 310; 30; 20 MG/100ML; MG/100ML; MG/100ML; MG/100ML
INJECTION, SOLUTION INTRAVENOUS
Status: DISCONTINUED | OUTPATIENT
Start: 2025-08-27 | End: 2025-08-27 | Stop reason: SDUPTHER

## 2025-08-27 RX ORDER — ETOMIDATE 2 MG/ML
INJECTION INTRAVENOUS
Status: DISCONTINUED | OUTPATIENT
Start: 2025-08-27 | End: 2025-08-27 | Stop reason: SDUPTHER

## 2025-08-27 RX ADMIN — SODIUM CHLORIDE, POTASSIUM CHLORIDE, SODIUM LACTATE AND CALCIUM CHLORIDE: 600; 310; 30; 20 INJECTION, SOLUTION INTRAVENOUS at 11:26

## 2025-08-27 RX ADMIN — SODIUM CHLORIDE, POTASSIUM CHLORIDE, SODIUM LACTATE AND CALCIUM CHLORIDE: 600; 310; 30; 20 INJECTION, SOLUTION INTRAVENOUS at 11:34

## 2025-08-27 RX ADMIN — ETOMIDATE 10 MG: 2 INJECTION, SOLUTION INTRAVENOUS at 11:40

## 2025-08-27 ASSESSMENT — PAIN SCALES - GENERAL
PAINLEVEL_OUTOF10: 0
PAINLEVEL_OUTOF10: 0

## 2025-08-27 ASSESSMENT — LIFESTYLE VARIABLES: SMOKING_STATUS: 1

## 2025-08-27 ASSESSMENT — PAIN - FUNCTIONAL ASSESSMENT: PAIN_FUNCTIONAL_ASSESSMENT: 0-10

## (undated) DEVICE — SOLUTION IRRIG 1000ML H2O PIC PLAS SHATTERPROOF CONTAINER

## (undated) DEVICE — SYRINGE MED 50ML LUERSLIP TIP

## (undated) DEVICE — FORCEPS BX JUMBO L240CM DIA2.8MM WRK CHN 3.2MM ORNG W/ NDL

## (undated) DEVICE — 4-PORT MANIFOLD: Brand: NEPTUNE 2

## (undated) DEVICE — ENDO KIT W/SYRINGE: Brand: MEDLINE INDUSTRIES, INC.

## (undated) DEVICE — HYBRID TUBING WITH CO2 CONNECTION FOR OLYMPUS 140/160/180/190 SERIES GI ENDOSCOPES WITH OLYMPUS AFU-100 , OLYMPUS OFP: Brand: ENDOGATOR HYBRID IRRIGATION TUBING

## (undated) DEVICE — FORCEPS BX L240CM JAW DIA2.2MM RAD JAW 4 HOT DISP

## (undated) DEVICE — Device: Brand: DEFENDO VALVE AND CONNECTOR KIT

## (undated) DEVICE — 1200CC SUCTION CANISTER WITH HYDROPHOBIC FILTER AND RED LID: Brand: BEMIS

## (undated) DEVICE — JELLY LUBRICATING 4OZ FLIP TOP TB E Z